# Patient Record
Sex: FEMALE | Race: BLACK OR AFRICAN AMERICAN | Employment: OTHER | URBAN - METROPOLITAN AREA
[De-identification: names, ages, dates, MRNs, and addresses within clinical notes are randomized per-mention and may not be internally consistent; named-entity substitution may affect disease eponyms.]

---

## 2019-08-13 ENCOUNTER — HOSPITAL ENCOUNTER (OUTPATIENT)
Age: 29
Discharge: HOME | End: 2019-08-13
Payer: COMMERCIAL

## 2019-08-13 ENCOUNTER — HOSPITAL ENCOUNTER (OUTPATIENT)
Age: 29
Discharge: HOME | End: 2019-08-15
Payer: COMMERCIAL

## 2019-08-13 ENCOUNTER — HOSPITAL ENCOUNTER (OUTPATIENT)
Dept: GENERAL RADIOLOGY | Age: 29
Discharge: HOME | End: 2019-08-15
Payer: COMMERCIAL

## 2019-08-13 DIAGNOSIS — R52 PAIN: ICD-10-CM

## 2019-08-13 PROCEDURE — 73030 X-RAY EXAM OF SHOULDER: CPT

## 2019-09-07 ENCOUNTER — HOSPITAL ENCOUNTER (OUTPATIENT)
Age: 29
Discharge: HOME | End: 2019-09-07
Payer: COMMERCIAL

## 2019-09-07 LAB
ALBUMIN SERPL-MCNC: 4.7 G/DL (ref 3.5–5.2)
ALP BLD-CCNC: 46 U/L (ref 35–104)
ALT SERPL-CCNC: 8 U/L (ref 0–32)
ANION GAP SERPL CALCULATED.3IONS-SCNC: 14 MMOL/L (ref 7–16)
AST SERPL-CCNC: 19 U/L (ref 0–31)
BASOPHILS ABSOLUTE: 0.02 E9/L (ref 0–0.2)
BASOPHILS RELATIVE PERCENT: 0.5 % (ref 0–2)
BILIRUB SERPL-MCNC: 0.3 MG/DL (ref 0–1.2)
BUN BLDV-MCNC: 14 MG/DL (ref 6–20)
CALCIUM SERPL-MCNC: 9 MG/DL (ref 8.6–10.2)
CHLORIDE BLD-SCNC: 103 MMOL/L (ref 98–107)
CHOLESTEROL, TOTAL: 142 MG/DL (ref 0–199)
CO2: 23 MMOL/L (ref 22–29)
CREAT SERPL-MCNC: 1.1 MG/DL (ref 0.5–1)
EOSINOPHILS ABSOLUTE: 0.25 E9/L (ref 0.05–0.5)
EOSINOPHILS RELATIVE PERCENT: 6.1 % (ref 0–6)
GFR AFRICAN AMERICAN: >60
GFR NON-AFRICAN AMERICAN: >60 ML/MIN/1.73
GLUCOSE BLD-MCNC: 93 MG/DL (ref 74–99)
HCT VFR BLD CALC: 34.4 % (ref 34–48)
HDLC SERPL-MCNC: 97 MG/DL
HEMOGLOBIN: 10.4 G/DL (ref 11.5–15.5)
IMMATURE GRANULOCYTES #: 0.01 E9/L
IMMATURE GRANULOCYTES %: 0.2 % (ref 0–5)
LDL CHOLESTEROL CALCULATED: 38 MG/DL (ref 0–99)
LYMPHOCYTES ABSOLUTE: 1.58 E9/L (ref 1.5–4)
LYMPHOCYTES RELATIVE PERCENT: 38.3 % (ref 20–42)
MCH RBC QN AUTO: 25.4 PG (ref 26–35)
MCHC RBC AUTO-ENTMCNC: 30.2 % (ref 32–34.5)
MCV RBC AUTO: 84.1 FL (ref 80–99.9)
MONOCYTES ABSOLUTE: 0.39 E9/L (ref 0.1–0.95)
MONOCYTES RELATIVE PERCENT: 9.5 % (ref 2–12)
NEUTROPHILS ABSOLUTE: 1.87 E9/L (ref 1.8–7.3)
NEUTROPHILS RELATIVE PERCENT: 45.4 % (ref 43–80)
PDW BLD-RTO: 20 FL (ref 11.5–15)
PLATELET # BLD: 275 E9/L (ref 130–450)
PMV BLD AUTO: 9.4 FL (ref 7–12)
POTASSIUM SERPL-SCNC: 4.1 MMOL/L (ref 3.5–5)
RBC # BLD: 4.09 E12/L (ref 3.5–5.5)
SODIUM BLD-SCNC: 140 MMOL/L (ref 132–146)
TOTAL PROTEIN: 7.8 G/DL (ref 6.4–8.3)
TRIGL SERPL-MCNC: 35 MG/DL (ref 0–149)
TSH SERPL DL<=0.05 MIU/L-ACNC: 0.88 UIU/ML (ref 0.27–4.2)
VITAMIN D 25-HYDROXY: 20 NG/ML (ref 30–100)
VLDLC SERPL CALC-MCNC: 7 MG/DL
WBC # BLD: 4.1 E9/L (ref 4.5–11.5)

## 2019-09-07 PROCEDURE — 84443 ASSAY THYROID STIM HORMONE: CPT

## 2019-09-07 PROCEDURE — 80053 COMPREHEN METABOLIC PANEL: CPT

## 2019-09-07 PROCEDURE — 80061 LIPID PANEL: CPT

## 2019-09-07 PROCEDURE — 82306 VITAMIN D 25 HYDROXY: CPT

## 2019-09-07 PROCEDURE — 36415 COLL VENOUS BLD VENIPUNCTURE: CPT

## 2019-09-07 PROCEDURE — 85025 COMPLETE CBC W/AUTO DIFF WBC: CPT

## 2021-03-01 ENCOUNTER — HOSPITAL ENCOUNTER (EMERGENCY)
Age: 31
Discharge: HOME | End: 2021-03-01
Attending: EMERGENCY MEDICINE
Payer: COMMERCIAL

## 2021-03-01 ENCOUNTER — HOSPITAL ENCOUNTER (EMERGENCY)
Age: 31
Discharge: HOME | End: 2021-03-01
Attending: FAMILY MEDICINE
Payer: COMMERCIAL

## 2021-03-01 ENCOUNTER — APPOINTMENT (OUTPATIENT)
Dept: ULTRASOUND IMAGING | Age: 31
End: 2021-03-01
Payer: COMMERCIAL

## 2021-03-01 VITALS
WEIGHT: 173 LBS | BODY MASS INDEX: 24.77 KG/M2 | DIASTOLIC BLOOD PRESSURE: 80 MMHG | TEMPERATURE: 98.5 F | OXYGEN SATURATION: 93 % | SYSTOLIC BLOOD PRESSURE: 130 MMHG | HEIGHT: 70 IN | HEART RATE: 104 BPM | RESPIRATION RATE: 16 BRPM

## 2021-03-01 VITALS
DIASTOLIC BLOOD PRESSURE: 63 MMHG | RESPIRATION RATE: 16 BRPM | WEIGHT: 178 LBS | TEMPERATURE: 98.2 F | BODY MASS INDEX: 25.48 KG/M2 | HEART RATE: 94 BPM | OXYGEN SATURATION: 100 % | SYSTOLIC BLOOD PRESSURE: 106 MMHG | HEIGHT: 70 IN

## 2021-03-01 DIAGNOSIS — O46.90 VAGINAL BLEEDING IN PREGNANCY: Primary | ICD-10-CM

## 2021-03-01 DIAGNOSIS — O03.9 SPONTANEOUS MISCARRIAGE: Primary | ICD-10-CM

## 2021-03-01 LAB
ABO/RH: NORMAL
BACTERIA: ABNORMAL /HPF
BASOPHILS ABSOLUTE: 0.03 E9/L (ref 0–0.2)
BASOPHILS RELATIVE PERCENT: 0.4 % (ref 0–2)
BILIRUBIN URINE: NEGATIVE
BLOOD, URINE: ABNORMAL
CLARITY: ABNORMAL
COLOR: ABNORMAL
EOSINOPHILS ABSOLUTE: 0.23 E9/L (ref 0.05–0.5)
EOSINOPHILS RELATIVE PERCENT: 2.9 % (ref 0–6)
GLUCOSE URINE: NEGATIVE MG/DL
GONADOTROPIN, CHORIONIC (HCG) QUANT: ABNORMAL MIU/ML
HCT VFR BLD CALC: 28.4 % (ref 34–48)
HEMOGLOBIN: 9.1 G/DL (ref 11.5–15.5)
IMMATURE GRANULOCYTES #: 0.02 E9/L
IMMATURE GRANULOCYTES %: 0.2 % (ref 0–5)
KETONES, URINE: NEGATIVE MG/DL
LEUKOCYTE ESTERASE, URINE: ABNORMAL
LYMPHOCYTES ABSOLUTE: 1.01 E9/L (ref 1.5–4)
LYMPHOCYTES RELATIVE PERCENT: 12.6 % (ref 20–42)
MCH RBC QN AUTO: 29.1 PG (ref 26–35)
MCHC RBC AUTO-ENTMCNC: 32 % (ref 32–34.5)
MCV RBC AUTO: 90.7 FL (ref 80–99.9)
MONOCYTES ABSOLUTE: 0.74 E9/L (ref 0.1–0.95)
MONOCYTES RELATIVE PERCENT: 9.2 % (ref 2–12)
NEUTROPHILS ABSOLUTE: 5.98 E9/L (ref 1.8–7.3)
NEUTROPHILS RELATIVE PERCENT: 74.7 % (ref 43–80)
NITRITE, URINE: NEGATIVE
PDW BLD-RTO: 19.6 FL (ref 11.5–15)
PH UA: 7 (ref 5–9)
PLATELET # BLD: 272 E9/L (ref 130–450)
PMV BLD AUTO: 10 FL (ref 7–12)
PROTEIN UA: 100 MG/DL
RBC # BLD: 3.13 E12/L (ref 3.5–5.5)
RBC UA: >20 /HPF (ref 0–2)
SPECIFIC GRAVITY UA: 1.01 (ref 1–1.03)
UROBILINOGEN, URINE: 0.2 E.U./DL
WBC # BLD: 8 E9/L (ref 4.5–11.5)
WBC UA: ABNORMAL /HPF (ref 0–5)

## 2021-03-01 PROCEDURE — 85613 RUSSELL VIPER VENOM DILUTED: CPT

## 2021-03-01 PROCEDURE — 99283 EMERGENCY DEPT VISIT LOW MDM: CPT

## 2021-03-01 PROCEDURE — 76801 OB US < 14 WKS SINGLE FETUS: CPT

## 2021-03-01 PROCEDURE — 86900 BLOOD TYPING SEROLOGIC ABO: CPT

## 2021-03-01 PROCEDURE — 86147 CARDIOLIPIN ANTIBODY EA IG: CPT

## 2021-03-01 PROCEDURE — 81291 MTHFR GENE: CPT

## 2021-03-01 PROCEDURE — 87088 URINE BACTERIA CULTURE: CPT

## 2021-03-01 PROCEDURE — 85025 COMPLETE CBC W/AUTO DIFF WBC: CPT

## 2021-03-01 PROCEDURE — 36415 COLL VENOUS BLD VENIPUNCTURE: CPT

## 2021-03-01 PROCEDURE — 84702 CHORIONIC GONADOTROPIN TEST: CPT

## 2021-03-01 PROCEDURE — 86901 BLOOD TYPING SEROLOGIC RH(D): CPT

## 2021-03-01 PROCEDURE — 88300 SURGICAL PATH GROSS: CPT

## 2021-03-01 PROCEDURE — 99282 EMERGENCY DEPT VISIT SF MDM: CPT

## 2021-03-01 PROCEDURE — 81241 F5 GENE: CPT

## 2021-03-01 PROCEDURE — 81240 F2 GENE: CPT

## 2021-03-01 PROCEDURE — 81400 MOPATH PROCEDURE LEVEL 1: CPT

## 2021-03-01 PROCEDURE — 81001 URINALYSIS AUTO W/SCOPE: CPT

## 2021-03-01 ASSESSMENT — ENCOUNTER SYMPTOMS
WHEEZING: 0
VOMITING: 0
SORE THROAT: 0
ABDOMINAL PAIN: 1
SHORTNESS OF BREATH: 0
EYE PAIN: 0
DIARRHEA: 0
COUGH: 0
NAUSEA: 0
CHOKING: 0
CHEST TIGHTNESS: 0
SINUS PAIN: 0

## 2021-03-01 NOTE — ED NOTES
Specimen sent to pathology with requision. Pt. Stated that a private service will be requested, information on how to proceed provided.      Lucila Mackay RN  03/01/21 1122

## 2021-03-01 NOTE — ED NOTES
Pt not in room-had told other staff that they are not waiting and they are going to [facility] after speaking with pt's physician.      [place] Des[place] RN  03/01/21 3672

## 2021-03-01 NOTE — ED NOTES
Pt. Requesting to see specimen, Dr. Coty Lilly made aware and ok with request, specimen taken to room.      Vic Armstrong, ZAINAB  03/01/21 2961

## 2021-03-01 NOTE — ED PROVIDER NOTES
HPI:  3/1/21,   Time: 6:22 AM TROY Vazquez is a 31 y.o. female who is approximately 14 weeks pregnant, presenting to the ED for vaginal bleeding that started around 430 this morning (less than 2 hours ago). She started having some cramping and felt sick in her stomach, nauseous, but did not have any emesis. She has had at least one loose stool since the symptoms began. She has been having intermittent spotting and what she calls menstrual bleeding in quantity, several times throughout her pregnancy. She already has had consultations with her obstetrician, Dr. Marcellina Klinefelter, and has a an ultrasound that showed a viable pregnancy. She does not recall what date this was done. ROS:   Pertinent positives and negatives are stated within HPI, all other systems reviewed and are negative.  --------------------------------------------- PAST HISTORY ---------------------------------------------  Past Medical History:  has no past medical history on file. Past Surgical History:  has a past surgical history that includes ECHO Compl W Dop Color Flow (5/2/2012); Dilation and curettage of uterus; and sinus surgery. Social History:  reports that she has never smoked. She does not have any smokeless tobacco history on file. She reports previous alcohol use. She reports that she does not use drugs. Family History: family history includes Diabetes in her maternal grandmother; Heart Disease in her maternal grandmother; High Blood Pressure in her maternal grandmother. The patients home medications have been reviewed. Allergies: Patient has no known allergies. -------------------------------------------------- RESULTS -------------------------------------------------  All laboratory and radiology results have been personally reviewed by myself   LABS:  No results found for this visit on 03/01/21.     RADIOLOGY:  Interpreted by Radiologist.  No orders to display       ------------------------- NURSING NOTES AND VITALS REVIEWED ---------------------------   The nursing notes within the ED encounter and vital signs as below have been reviewed. /80   Pulse 104   Temp 98.5 °F (36.9 °C) (Oral)   Resp 16   Ht 5' 10\" (1.778 m)   Wt 173 lb (78.5 kg)   LMP 01/03/2021   SpO2 93%   BMI 24.82 kg/m²   Oxygen Saturation Interpretation: Normal      ---------------------------------------------------PHYSICAL EXAM--------------------------------------    Constitutional/General: Alert and oriented x3, well appearing, non toxic in NAD  Head: NC/AT  Eyes: PERRL, EOMI  Mouth: Oropharynx clear, handling secretions, no trismus  Neck: Supple, full ROM, no meningeal signs  Pulmonary: Lungs clear to auscultation bilaterally, no wheezes, rales, or rhonchi. Not in respiratory distress  Cardiovascular:  Regular rate and rhythm, no murmurs, gallops, or rubs. 2+ distal pulses  Abdomen: Soft, non tender, non distended,   Extremities: Moves all extremities x 4. Warm and well perfused  Skin: warm and dry without rash  Neurologic: GCS 15,  Psych: Normal Affect      ------------------------------ ED COURSE/MEDICAL DECISION MAKING----------------------  Medications - No data to display      Medical Decision Making:    I discussed the differential diagnosis of bleeding during the first trimester pregnancy that would include threatened miscarriage, versus normal bleeding/spotting during pregnancy. Recommended blood tests for serum hCG. Offered the patient to wait here in the ED until ultrasound technician arrives with that and we will perform an and OB ultrasound. While the patient was in the ED, Dr. Asia sanchez (her obstetrician) returned the patient's call that they had placed earlier and they directed her to go to the emergency department at [facility]. A blood draw was taken to test for serum hCG, results are pending at the time the discharge. Counseling:    The emergency provider

## 2021-03-01 NOTE — ED PROVIDER NOTES
ED  Provider Note  Admit Date/RoomTime: 3/1/2021  7:35 AM  ED Room:      HPI:   Jarret Isabel is a 31 y.o. female presenting to the ED for vaginal bleeding, beginning 3 hours ago. History comes primarily from the patient. Past medical history includes none. The complaint has been persistent, moderate in severity, improved by nothing and worsened by nothing. Associated symptoms include none. [facility] awoke at approximately 6:00 this morning and noted abdominal pain and cramping as well as passage of blood clots is persistent vaginal bleeding. The patient is 14 weeks pregnant. She is G3, P0 with 1 previous  and one previous miscarriage. She follows with outpatient obstetrics, and had a recent ultrasound at her obstetric facility which revealed a live intrauterine pregnancy. Concerned by these findings, she presented to [facility] emergency department for further evaluation and treatment. On arrival, she was assessed with history, physical exam, imaging studies, laboratory studies, vital signs. The patient's vital signs were stable on arrival and she was afebrile. Review of Systems   Constitutional: Negative for appetite change, chills and fever. HENT: Negative for sinus pain and sore throat. Eyes: Negative for pain and visual disturbance. Respiratory: Negative for cough, choking, chest tightness, shortness of breath and wheezing. Cardiovascular: Negative for chest pain and palpitations. Gastrointestinal: Positive for abdominal pain. Negative for diarrhea, nausea and vomiting. Genitourinary: Positive for pelvic pain, vaginal bleeding and vaginal discharge. Negative for hematuria. Musculoskeletal: Negative for neck pain and neck stiffness. Skin: Negative for rash. Neurological: Negative for tremors, syncope and weakness. Psychiatric/Behavioral: Negative for confusion. Physical Exam  Vitals signs and nursing note reviewed. Constitutional:       Appearance: She is well-developed. HENT:      Head: Normocephalic and atraumatic. Eyes:      Pupils: Pupils are equal, round, and reactive to light. Neck:      Musculoskeletal: Normal range of motion and neck supple. Cardiovascular:      Rate and Rhythm: Normal rate and regular rhythm. Pulmonary:      Effort: Pulmonary effort is normal. No respiratory distress. Breath sounds: Normal breath sounds. No wheezing or rales. Abdominal:      General: Bowel sounds are normal.      Palpations: Abdomen is soft. Tenderness: There is no abdominal tenderness. There is no guarding or rebound. Genitourinary:     Comments: Bedside ultrasound performed. No viable intrauterine pregnancy is appreciated  Skin:     General: Skin is warm and dry. Neurological:      Mental Status: She is alert and oriented to person, place, and time. Cranial Nerves: No cranial nerve deficit. Coordination: Coordination normal.          Procedures     MDM  Number of Diagnoses or Management Options  Spontaneous miscarriage  Diagnosis management comments: Emergency Department evaluation was notable for miscarriage. Patient actively miscarried while in the emergency department, with passage of products of conception. Her vital signs remained stable throughout the entirety of her emergency department stay. Her bleeding is significantly diminished after passage of these products. Patient is understandably upset at this event, however it is clinically stable and is considered appropriate for discharge to home with outpatient follow-up with her obstetrician. They were advised to return to the emergency department should they develop fever, chills, night sweats, nausea, vomiting, diarrhea, chest pain, shortness of breath, or worsening of their symptoms despite treatment from this emergency department visit. They were instructed to follow-up with their primary care provider in 2 days.  This information was relayed to the patient who understood this plan of care and was amenable to the plan. ED Course as of Mar 01 1025   Mon Mar 01, 2021   0068 Patient went to the restroom and passed clearly visible products of conception. [RK]   5942 Discussed patient with Dr. Nicole Hutchins. Given that this is the patient's third loss, he advised that patient have outpatient labs drawn including thrombophilia panel, cardiolipin, factor V Leiden. Advised to follow-up with the patient in 10 days at his facility. This information was relayed to the patient and her significant other. She states that her bleeding and cramping have diminished some passage of products of conception. They wish to see the remains before being discharged. [RK]      ED Course User Index  [RK] Sandy Út 45., DO          ED Course as of Mar 01 1025   Mon Mar 01, 2021   3016 Patient went to the restroom and passed clearly visible products of conception. [RK]   0617 Discussed patient with Dr. Nicole Hutchins. Given that this is the patient's third loss, he advised that patient have outpatient labs drawn including thrombophilia panel, cardiolipin, factor V Leiden. Advised to follow-up with the patient in 10 days at his facility. This information was relayed to the patient and her significant other. She states that her bleeding and cramping have diminished some passage of products of conception. They wish to see the remains before being discharged. [RK]      ED Course User Index  [RK] Ishmael Witt, DO       --------------------------------------------- PAST HISTORY ---------------------------------------------  Past Medical History:  has no past medical history on file. Past Surgical History:  has a past surgical history that includes ECHO Compl W Dop Color Flow (5/2/2012); Dilation and curettage of uterus; and sinus surgery. Social History:  reports that she has never smoked.  She does not have any smokeless tobacco history on file. She reports previous alcohol use. She reports that she does not use drugs. Family History: family history includes Diabetes in her maternal grandmother; Heart Disease in her maternal grandmother; High Blood Pressure in her maternal grandmother. The patients home medications have been reviewed. Allergies: Patient has no known allergies.     -------------------------------------------------- RESULTS -------------------------------------------------  Labs:  Results for orders placed or performed during the hospital encounter of 03/01/21   CBC Auto Differential   Result Value Ref Range    WBC 8.0 4.5 - 11.5 E9/L    RBC 3.13 (L) 3.50 - 5.50 E12/L    Hemoglobin 9.1 (L) 11.5 - 15.5 g/dL    Hematocrit 28.4 (L) 34.0 - 48.0 %    MCV 90.7 80.0 - 99.9 fL    MCH 29.1 26.0 - 35.0 pg    MCHC 32.0 32.0 - 34.5 %    RDW 19.6 (H) 11.5 - 15.0 fL    Platelets 038 141 - 845 E9/L    MPV 10.0 7.0 - 12.0 fL    Neutrophils % 74.7 43.0 - 80.0 %    Immature Granulocytes % 0.2 0.0 - 5.0 %    Lymphocytes % 12.6 (L) 20.0 - 42.0 %    Monocytes % 9.2 2.0 - 12.0 %    Eosinophils % 2.9 0.0 - 6.0 %    Basophils % 0.4 0.0 - 2.0 %    Neutrophils Absolute 5.98 1.80 - 7.30 E9/L    Immature Granulocytes # 0.02 E9/L    Lymphocytes Absolute 1.01 (L) 1.50 - 4.00 E9/L    Monocytes Absolute 0.74 0.10 - 0.95 E9/L    Eosinophils Absolute 0.23 0.05 - 0.50 E9/L    Basophils Absolute 0.03 0.00 - 0.20 E9/L   Urinalysis, reflex to microscopic   Result Value Ref Range    Color, UA PINK Straw/Yellow    Clarity, UA SL CLOUDY Clear    Glucose, Ur Negative Negative mg/dL    Bilirubin Urine Negative Negative    Ketones, Urine Negative Negative mg/dL    Specific Gravity, UA 1.010 1.005 - 1.030    Blood, Urine LARGE (A) Negative    pH, UA 7.0 5.0 - 9.0    Protein,  (A) Negative mg/dL    Urobilinogen, Urine 0.2 <2.0 E.U./dL    Nitrite, Urine Negative Negative    Leukocyte Esterase, Urine MODERATE (A) Negative   Microscopic Urinalysis   Result Value Ref Range    WBC, UA 5-10 (A) 0 - 5 /HPF    RBC, UA >20 0 - 2 /HPF    Bacteria, UA NONE SEEN None Seen /HPF   ABO/RH   Result Value Ref Range    ABO/Rh O POS        Radiology:  US OB LESS THAN 14 WEEKS SINGLE OR FIRST GESTATION   Final Result   1. No evidence of a live intrauterine pregnancy. 2. Marked endometrial thickening with heterogeneous appearance. The findings   could be related to incomplete .          ------------------------- NURSING NOTES AND VITALS REVIEWED ---------------------------  Date / Time Roomed:  3/1/2021  7:35 AM  ED Bed Assignment:      The nursing notes within the ED encounter and vital signs as below have been reviewed. /63   Pulse 94   Temp 98.2 °F (36.8 °C) (Oral)   Resp 16   Ht 5' 10\" (1.778 m)   Wt 178 lb (80.7 kg)   LMP 2021   SpO2 100%   BMI 25.54 kg/m²   Oxygen Saturation Interpretation: Normal      ------------------------------------------ PROGRESS NOTES ------------------------------------------  9:55 AM EST  I have spoken with the patient and discussed todays results, in addition to providing specific details for the plan of care and counseling regarding the diagnosis and prognosis. Their questions are answered at this time and they are agreeable with the plan. I discussed at length with them reasons for immediate return here for re evaluation. They will followup with their OBGYN by calling their office tomorrow. --------------------------------- ADDITIONAL PROVIDER NOTES ---------------------------------  At this time the patient is without objective evidence of an acute process requiring hospitalization or inpatient management. They have remained hemodynamically stable throughout their entire ED visit and are stable for discharge with outpatient follow-up. The plan has been discussed in detail and they are aware of the specific conditions for emergent return, as well as the importance of follow-up.       New Prescriptions    No

## 2021-03-01 NOTE — ED NOTES
While in bathroom pt. Passed what appears to be product of conception, specimen placed in specimen bowl and Dr. Laurie Ledbetter notified.      Zbigniew Ruvalcaba RN  03/01/21 1024

## 2021-03-02 LAB — LUPUS ANTICOAG DVVT: NORMAL

## 2021-03-03 ENCOUNTER — HOSPITAL ENCOUNTER (EMERGENCY)
Age: 31
Discharge: HOME | End: 2021-03-03
Attending: EMERGENCY MEDICINE
Payer: COMMERCIAL

## 2021-03-03 ENCOUNTER — ANESTHESIA (OUTPATIENT)
Dept: OPERATING ROOM | Age: 31
End: 2021-03-03
Payer: COMMERCIAL

## 2021-03-03 ENCOUNTER — ANESTHESIA EVENT (OUTPATIENT)
Dept: OPERATING ROOM | Age: 31
End: 2021-03-03
Payer: COMMERCIAL

## 2021-03-03 VITALS — DIASTOLIC BLOOD PRESSURE: 64 MMHG | SYSTOLIC BLOOD PRESSURE: 118 MMHG | OXYGEN SATURATION: 96 %

## 2021-03-03 VITALS
BODY MASS INDEX: 26.2 KG/M2 | HEART RATE: 74 BPM | OXYGEN SATURATION: 100 % | SYSTOLIC BLOOD PRESSURE: 118 MMHG | HEIGHT: 70 IN | RESPIRATION RATE: 20 BRPM | DIASTOLIC BLOOD PRESSURE: 62 MMHG | TEMPERATURE: 98.4 F | WEIGHT: 183 LBS

## 2021-03-03 DIAGNOSIS — O03.4 INCOMPLETE MISCARRIAGE: Primary | ICD-10-CM

## 2021-03-03 LAB
ABO/RH: NORMAL
ANION GAP SERPL CALCULATED.3IONS-SCNC: 10 MMOL/L (ref 7–16)
ANTIBODY SCREEN: NORMAL
APTT: 26.3 SEC (ref 24.5–35.1)
BASOPHILS ABSOLUTE: 0.02 E9/L (ref 0–0.2)
BASOPHILS RELATIVE PERCENT: 0.2 % (ref 0–2)
BUN BLDV-MCNC: 7 MG/DL (ref 6–20)
CALCIUM SERPL-MCNC: 9.4 MG/DL (ref 8.6–10.2)
CHLORIDE BLD-SCNC: 101 MMOL/L (ref 98–107)
CO2: 24 MMOL/L (ref 22–29)
CREAT SERPL-MCNC: 0.7 MG/DL (ref 0.5–1)
EOSINOPHILS ABSOLUTE: 0.13 E9/L (ref 0.05–0.5)
EOSINOPHILS RELATIVE PERCENT: 1.4 % (ref 0–6)
GFR AFRICAN AMERICAN: >60
GFR NON-AFRICAN AMERICAN: >60 ML/MIN/1.73
GLUCOSE BLD-MCNC: 76 MG/DL (ref 74–99)
HCT VFR BLD CALC: 28.7 % (ref 34–48)
HEMOGLOBIN: 9.1 G/DL (ref 11.5–15.5)
IMMATURE GRANULOCYTES #: 0.03 E9/L
IMMATURE GRANULOCYTES %: 0.3 % (ref 0–5)
INR BLD: 1
LYMPHOCYTES ABSOLUTE: 1.41 E9/L (ref 1.5–4)
LYMPHOCYTES RELATIVE PERCENT: 15.6 % (ref 20–42)
MCH RBC QN AUTO: 28.5 PG (ref 26–35)
MCHC RBC AUTO-ENTMCNC: 31.7 % (ref 32–34.5)
MCV RBC AUTO: 90 FL (ref 80–99.9)
MONOCYTES ABSOLUTE: 0.7 E9/L (ref 0.1–0.95)
MONOCYTES RELATIVE PERCENT: 7.8 % (ref 2–12)
NEUTROPHILS ABSOLUTE: 6.72 E9/L (ref 1.8–7.3)
NEUTROPHILS RELATIVE PERCENT: 74.7 % (ref 43–80)
ORGANISM: ABNORMAL
PDW BLD-RTO: 19.1 FL (ref 11.5–15)
PLATELET # BLD: 299 E9/L (ref 130–450)
PMV BLD AUTO: 9.8 FL (ref 7–12)
POTASSIUM REFLEX MAGNESIUM: 3.7 MMOL/L (ref 3.5–5)
PROTHROMBIN TIME: 11 SEC (ref 9.3–12.4)
RBC # BLD: 3.19 E12/L (ref 3.5–5.5)
SODIUM BLD-SCNC: 135 MMOL/L (ref 132–146)
URINE CULTURE, ROUTINE: ABNORMAL
WBC # BLD: 9 E9/L (ref 4.5–11.5)

## 2021-03-03 PROCEDURE — 3700000001 HC ADD 15 MINUTES (ANESTHESIA): Performed by: OBSTETRICS & GYNECOLOGY

## 2021-03-03 PROCEDURE — 87040 BLOOD CULTURE FOR BACTERIA: CPT

## 2021-03-03 PROCEDURE — 2709999900 HC NON-CHARGEABLE SUPPLY: Performed by: OBSTETRICS & GYNECOLOGY

## 2021-03-03 PROCEDURE — 99282 EMERGENCY DEPT VISIT SF MDM: CPT

## 2021-03-03 PROCEDURE — 85610 PROTHROMBIN TIME: CPT

## 2021-03-03 PROCEDURE — 2580000003 HC RX 258: Performed by: NURSE ANESTHETIST, CERTIFIED REGISTERED

## 2021-03-03 PROCEDURE — 7100000000 HC PACU RECOVERY - FIRST 15 MIN: Performed by: OBSTETRICS & GYNECOLOGY

## 2021-03-03 PROCEDURE — 2500000003 HC RX 250 WO HCPCS: Performed by: GENERAL PRACTICE

## 2021-03-03 PROCEDURE — 96374 THER/PROPH/DIAG INJ IV PUSH: CPT

## 2021-03-03 PROCEDURE — 2500000003 HC RX 250 WO HCPCS: Performed by: NURSE ANESTHETIST, CERTIFIED REGISTERED

## 2021-03-03 PROCEDURE — 3600000002 HC SURGERY LEVEL 2 BASE: Performed by: OBSTETRICS & GYNECOLOGY

## 2021-03-03 PROCEDURE — 80048 BASIC METABOLIC PNL TOTAL CA: CPT

## 2021-03-03 PROCEDURE — 88305 TISSUE EXAM BY PATHOLOGIST: CPT

## 2021-03-03 PROCEDURE — 96375 TX/PRO/DX INJ NEW DRUG ADDON: CPT

## 2021-03-03 PROCEDURE — 86850 RBC ANTIBODY SCREEN: CPT

## 2021-03-03 PROCEDURE — 6360000002 HC RX W HCPCS: Performed by: NURSE ANESTHETIST, CERTIFIED REGISTERED

## 2021-03-03 PROCEDURE — 86900 BLOOD TYPING SEROLOGIC ABO: CPT

## 2021-03-03 PROCEDURE — 86901 BLOOD TYPING SEROLOGIC RH(D): CPT

## 2021-03-03 PROCEDURE — 3600000012 HC SURGERY LEVEL 2 ADDTL 15MIN: Performed by: OBSTETRICS & GYNECOLOGY

## 2021-03-03 PROCEDURE — 6360000002 HC RX W HCPCS: Performed by: GENERAL PRACTICE

## 2021-03-03 PROCEDURE — 85025 COMPLETE CBC W/AUTO DIFF WBC: CPT

## 2021-03-03 PROCEDURE — 7100000001 HC PACU RECOVERY - ADDTL 15 MIN: Performed by: OBSTETRICS & GYNECOLOGY

## 2021-03-03 PROCEDURE — 3700000000 HC ANESTHESIA ATTENDED CARE: Performed by: OBSTETRICS & GYNECOLOGY

## 2021-03-03 PROCEDURE — 85730 THROMBOPLASTIN TIME PARTIAL: CPT

## 2021-03-03 PROCEDURE — 2580000003 HC RX 258: Performed by: GENERAL PRACTICE

## 2021-03-03 RX ORDER — MEPERIDINE HYDROCHLORIDE 25 MG/ML
12.5 INJECTION INTRAMUSCULAR; INTRAVENOUS; SUBCUTANEOUS EVERY 5 MIN PRN
Status: DISCONTINUED | OUTPATIENT
Start: 2021-03-03 | End: 2021-03-03 | Stop reason: HOSPADM

## 2021-03-03 RX ORDER — ONDANSETRON 2 MG/ML
INJECTION INTRAMUSCULAR; INTRAVENOUS PRN
Status: DISCONTINUED | OUTPATIENT
Start: 2021-03-03 | End: 2021-03-03 | Stop reason: SDUPTHER

## 2021-03-03 RX ORDER — IBUPROFEN 600 MG/1
600 TABLET ORAL EVERY 6 HOURS PRN
Qty: 30 TABLET | Refills: 0 | Status: ON HOLD | OUTPATIENT
Start: 2021-03-03 | End: 2022-02-27 | Stop reason: HOSPADM

## 2021-03-03 RX ORDER — FENTANYL CITRATE 50 UG/ML
50 INJECTION, SOLUTION INTRAMUSCULAR; INTRAVENOUS EVERY 5 MIN PRN
Status: DISCONTINUED | OUTPATIENT
Start: 2021-03-03 | End: 2021-03-03 | Stop reason: HOSPADM

## 2021-03-03 RX ORDER — PROPOFOL 10 MG/ML
INJECTION, EMULSION INTRAVENOUS PRN
Status: DISCONTINUED | OUTPATIENT
Start: 2021-03-03 | End: 2021-03-03 | Stop reason: SDUPTHER

## 2021-03-03 RX ORDER — MORPHINE SULFATE 2 MG/ML
1 INJECTION, SOLUTION INTRAMUSCULAR; INTRAVENOUS EVERY 5 MIN PRN
Status: DISCONTINUED | OUTPATIENT
Start: 2021-03-03 | End: 2021-03-03 | Stop reason: HOSPADM

## 2021-03-03 RX ORDER — LIDOCAINE HYDROCHLORIDE 20 MG/ML
INJECTION, SOLUTION INFILTRATION; PERINEURAL PRN
Status: DISCONTINUED | OUTPATIENT
Start: 2021-03-03 | End: 2021-03-03 | Stop reason: SDUPTHER

## 2021-03-03 RX ORDER — MORPHINE SULFATE 2 MG/ML
2 INJECTION, SOLUTION INTRAMUSCULAR; INTRAVENOUS EVERY 5 MIN PRN
Status: DISCONTINUED | OUTPATIENT
Start: 2021-03-03 | End: 2021-03-03 | Stop reason: HOSPADM

## 2021-03-03 RX ORDER — DEXAMETHASONE SODIUM PHOSPHATE 4 MG/ML
INJECTION, SOLUTION INTRA-ARTICULAR; INTRALESIONAL; INTRAMUSCULAR; INTRAVENOUS; SOFT TISSUE PRN
Status: DISCONTINUED | OUTPATIENT
Start: 2021-03-03 | End: 2021-03-03 | Stop reason: SDUPTHER

## 2021-03-03 RX ORDER — MIDAZOLAM HYDROCHLORIDE 1 MG/ML
INJECTION INTRAMUSCULAR; INTRAVENOUS PRN
Status: DISCONTINUED | OUTPATIENT
Start: 2021-03-03 | End: 2021-03-03 | Stop reason: SDUPTHER

## 2021-03-03 RX ORDER — FENTANYL CITRATE 50 UG/ML
25 INJECTION, SOLUTION INTRAMUSCULAR; INTRAVENOUS EVERY 5 MIN PRN
Status: DISCONTINUED | OUTPATIENT
Start: 2021-03-03 | End: 2021-03-03 | Stop reason: HOSPADM

## 2021-03-03 RX ORDER — ONDANSETRON 2 MG/ML
4 INJECTION INTRAMUSCULAR; INTRAVENOUS
Status: DISCONTINUED | OUTPATIENT
Start: 2021-03-03 | End: 2021-03-03 | Stop reason: HOSPADM

## 2021-03-03 RX ORDER — PROMETHAZINE HYDROCHLORIDE 25 MG/ML
6.25 INJECTION, SOLUTION INTRAMUSCULAR; INTRAVENOUS
Status: DISCONTINUED | OUTPATIENT
Start: 2021-03-03 | End: 2021-03-03 | Stop reason: HOSPADM

## 2021-03-03 RX ORDER — SODIUM CHLORIDE 9 MG/ML
INJECTION, SOLUTION INTRAVENOUS CONTINUOUS PRN
Status: DISCONTINUED | OUTPATIENT
Start: 2021-03-03 | End: 2021-03-03 | Stop reason: SDUPTHER

## 2021-03-03 RX ORDER — CLINDAMYCIN PHOSPHATE 900 MG/50ML
900 INJECTION INTRAVENOUS ONCE
Status: COMPLETED | OUTPATIENT
Start: 2021-03-03 | End: 2021-03-03

## 2021-03-03 RX ORDER — OXYCODONE HYDROCHLORIDE AND ACETAMINOPHEN 5; 325 MG/1; MG/1
1 TABLET ORAL
Status: DISCONTINUED | OUTPATIENT
Start: 2021-03-03 | End: 2021-03-03 | Stop reason: HOSPADM

## 2021-03-03 RX ORDER — FENTANYL CITRATE 50 UG/ML
INJECTION, SOLUTION INTRAMUSCULAR; INTRAVENOUS PRN
Status: DISCONTINUED | OUTPATIENT
Start: 2021-03-03 | End: 2021-03-03 | Stop reason: SDUPTHER

## 2021-03-03 RX ADMIN — FENTANYL CITRATE 100 MCG: 50 INJECTION, SOLUTION INTRAMUSCULAR; INTRAVENOUS at 17:15

## 2021-03-03 RX ADMIN — CLINDAMYCIN IN 5 PERCENT DEXTROSE 900 MG: 18 INJECTION, SOLUTION INTRAVENOUS at 15:20

## 2021-03-03 RX ADMIN — CEFTRIAXONE 1000 MG: 1 INJECTION, POWDER, FOR SOLUTION INTRAMUSCULAR; INTRAVENOUS at 15:20

## 2021-03-03 RX ADMIN — PROPOFOL 200 MG: 10 INJECTION, EMULSION INTRAVENOUS at 17:15

## 2021-03-03 RX ADMIN — DEXAMETHASONE SODIUM PHOSPHATE 10 MG: 4 INJECTION, SOLUTION INTRAMUSCULAR; INTRAVENOUS at 17:20

## 2021-03-03 RX ADMIN — ONDANSETRON 4 MG: 2 INJECTION INTRAMUSCULAR; INTRAVENOUS at 17:20

## 2021-03-03 RX ADMIN — MIDAZOLAM 2 MG: 1 INJECTION INTRAMUSCULAR; INTRAVENOUS at 17:05

## 2021-03-03 RX ADMIN — LIDOCAINE HYDROCHLORIDE 100 MG: 20 INJECTION, SOLUTION INFILTRATION; PERINEURAL at 17:15

## 2021-03-03 RX ADMIN — SODIUM CHLORIDE: 9 INJECTION, SOLUTION INTRAVENOUS at 15:35

## 2021-03-03 ASSESSMENT — PULMONARY FUNCTION TESTS
PIF_VALUE: 4
PIF_VALUE: 0
PIF_VALUE: 4
PIF_VALUE: 1
PIF_VALUE: 16
PIF_VALUE: 16
PIF_VALUE: 1
PIF_VALUE: 1
PIF_VALUE: 16
PIF_VALUE: 1
PIF_VALUE: 12
PIF_VALUE: 16
PIF_VALUE: 0
PIF_VALUE: 16
PIF_VALUE: 16

## 2021-03-03 ASSESSMENT — ENCOUNTER SYMPTOMS
SHORTNESS OF BREATH: 0
DIARRHEA: 0
SORE THROAT: 0
SINUS PAIN: 0
CHOKING: 0
NAUSEA: 0
WHEEZING: 0
EYE PAIN: 0
ABDOMINAL PAIN: 0
COUGH: 0
CHEST TIGHTNESS: 0
VOMITING: 0

## 2021-03-03 ASSESSMENT — PAIN SCALES - GENERAL: PAINLEVEL_OUTOF10: 2

## 2021-03-03 ASSESSMENT — LIFESTYLE VARIABLES: SMOKING_STATUS: 0

## 2021-03-03 NOTE — H&P
Department of Obstetrics & Gynecology  GYNECOLOGIC ADMISSION  HISTORY & PHYSICAL      CHIEF COMPLAINT:  Incomplete   Chief Complaint   Patient presents with    Other     sent by [facility] for possible D&C, states miscarried Monday       HISTORY OF PRESENT ILLNESS:    The patient is a 31 y.o. female, , who was 14w6d and developed lower pelvic cramping and bleeding. After arrival to [facility], she passed the fetus, and was thereafter reported as having passed all products of conception. She was seen in FU in office earlier today, with some ongoing spotting of onset earlier today, and Office sono showed a thickened EMC, suggestive of retained tissue of Placental origin. She was then sent to the ER for IV Antibiotics, and scheduled for a D&C. She now presents for same. Chief Complaint   Patient presents with    Other     sent by [facility] for possible D&C, states miscarried Monday   . PAST OB HISTORY  OB History        1    Para        Term                AB        Living           SAB        TAB        Ectopic        Molar        Multiple        Live Births                    PAST MEDICAL HISTORY:    No past medical history on file. PAST SURGICAL HISTORY:        Procedure Laterality Date    DILATION AND CURETTAGE OF UTERUS      ECHO COMPL W DOP COLOR FLOW  2012         SINUS SURGERY      for epistaxis     ALLERGIES:  Patient has no known allergies.   SOCIAL HISTORY:    Social History     Socioeconomic History    Marital status:      Spouse name: Not on file    Number of children: Not on file    Years of education: Not on file    Highest education level: Not on file   Occupational History    Not on file   Social Needs    Financial resource strain: Not on file    Food insecurity     Worry: Not on file     Inability: Not on file    Transportation needs     Medical: Not on file     Non-medical: Not on file   Tobacco Use    Smoking status: Never Smoker   Substance and Sexual Activity    Alcohol use: Not Currently     Comment: ONCE OR TWICE A MONTH    Drug use: No    Sexual activity: Never   Lifestyle    Physical activity     Days per week: Not on file     Minutes per session: Not on file    Stress: Not on file   Relationships    Social connections     Talks on phone: Not on file     Gets together: Not on file     Attends [employer] service: Not on file     Active member of club or organization: Not on file     Attends meetings of clubs or organizations: Not on file     Relationship status: Not on file    Intimate partner violence     Fear of current or ex partner: Not on file     Emotionally abused: Not on file     Physically abused: Not on file     Forced sexual activity: Not on file   Other Topics Concern    Not on file   Social History Narrative    ** Merged History Encounter **          FAMILY HISTORY:       Problem Relation Age of Onset    Heart Disease Maternal Grandmother     High Blood Pressure Maternal Grandmother     Diabetes Maternal Grandmother      MEDICATIONS PRIOR TO ADMISSION:  Not in a hospital admission. REVIEW OF SYSTEMS:    CONSTITUTIONAL:  negative  RESPIRATORY:  negative  CARDIOVASCULAR:  negative  GASTROINTESTINAL:  negative  ALLERGIC/IMMUNOLOGIC:  negative  NEUROLOGICAL:  negative  BEHAVIOR/PSYCH:  negative    ROS: Negative except for HPI. PHYSICAL EXAM: General Appearance:  awake, alert, oriented, in no acute distress  Eyes:  No gross abnormalities. , PERRL, EOMI and Sclera nonicteric  Lungs:  Normal expansion. Clear to auscultation. No rales, rhonchi, or wheezing. Heart:  Heart regular rate and rhythm  Abdomen:  Soft, non-tender, normal bowel sounds. No bruits, organomegaly or masses. Pelvic:  Uterus enlarged to about 12 wks size, mildly tender to cervical motion. Cervical os 1cm Dilated with some mucoid sanguinous discharge. ASSESSMENT: Incomplete .     PLAN: D&C    Stu Juan MD, Gerber Vela

## 2021-03-03 NOTE — ED PROVIDER NOTES
ED  Provider Note  Admit Date/RoomTime: 3/3/2021  1:42 PM  ED Room: OR POOL/NONE     HPI:   Madie Aase is a 31 y.o. female presenting to the ED for vaginal bleeding, beginning 2 days ago. History comes primarily from the patient and the Medical Record. Past medical history includes recent spontaneous miscarriage 2 days ago, initially assessed at [facility] ED. The complaint has been intermittent, moderate in severity, improved by nothing and worsened by nothing. Associated symptoms include pelvic cramping. Axel Boyd had follow-up with her gynecologist after having a spontaneous loss of pregnancy at approximately 14 weeks on March 1. During the assessment by the gynecologist, it was noted that, although the patient believed that she had passed the entirety of her products of conception, it was evident that there is still retention of these products. This places the patient at increased risk for persistent dangerous vaginal bleeding as well as possible infection. For this reason she required OR evaluation for D&C. Her gynecologist directed her to [facility] emergency department where she could have initial labs drawn and weight under a monitored setting prior to being taken to the OR. Review of Systems   Constitutional: Negative for appetite change, chills and fever. HENT: Negative for sinus pain and sore throat. Eyes: Negative for pain and visual disturbance. Respiratory: Negative for cough, choking, chest tightness, shortness of breath and wheezing. Cardiovascular: Negative for chest pain and palpitations. Gastrointestinal: Negative for abdominal pain, diarrhea, nausea and vomiting. Genitourinary: Positive for pelvic pain and vaginal bleeding. Negative for hematuria. Musculoskeletal: Negative for neck pain and neck stiffness. Skin: Negative for rash. Neurological: Negative for tremors, syncope and weakness. Psychiatric/Behavioral: Negative for confusion. Physical Exam  Vitals signs and nursing note reviewed. Constitutional:       Appearance: She is well-developed. HENT:      Head: Normocephalic and atraumatic. Eyes:      Pupils: Pupils are equal, round, and reactive to light. Neck:      Musculoskeletal: Normal range of motion and neck supple. Cardiovascular:      Rate and Rhythm: Normal rate and regular rhythm. Pulmonary:      Effort: Pulmonary effort is normal. No respiratory distress. Breath sounds: Normal breath sounds. No wheezing or rales. Abdominal:      General: Bowel sounds are normal.      Palpations: Abdomen is soft. Tenderness: There is no abdominal tenderness. There is no guarding or rebound. Skin:     General: Skin is warm and dry. Neurological:      Mental Status: She is alert and oriented to person, place, and time. Cranial Nerves: No cranial nerve deficit. Coordination: Coordination normal.          Procedures     MDM  Number of Diagnoses or Management Options  Diagnosis management comments: Emergency department evaluation was notable for a patient with a recent spontaneous miscarriage who required further D&C as per the direction of her obstetrician. She was assessed in the emergency department prior to being taken to the operating room for this procedure. This information was relayed to the patient who understood this plan of care and was amenable to the plan. Patient was discussed with the admitting service (Dr. Shaan Reid) who concurred with the decision for admission, and have agreed to admit the patient to the OR.          --------------------------------------------- PAST HISTORY ---------------------------------------------  Past Medical History:  has no past medical history on file. Past Surgical History:  has a past surgical history that includes ECHO Compl W Dop Color Flow (5/2/2012); Dilation and curettage of uterus; and sinus surgery. Social History:  reports that she has never smoked. She does not have any smokeless tobacco history on file. She reports previous alcohol use. She reports that she does not use drugs. Family History: family history includes Diabetes in her maternal grandmother; Heart Disease in her maternal grandmother; High Blood Pressure in her maternal grandmother. The patients home medications have been reviewed. Allergies: Patient has no known allergies.     -------------------------------------------------- RESULTS -------------------------------------------------    LABS:  Results for orders placed or performed during the hospital encounter of 03/03/21   CBC Auto Differential   Result Value Ref Range    WBC 9.0 4.5 - 11.5 E9/L    RBC 3.19 (L) 3.50 - 5.50 E12/L    Hemoglobin 9.1 (L) 11.5 - 15.5 g/dL    Hematocrit 28.7 (L) 34.0 - 48.0 %    MCV 90.0 80.0 - 99.9 fL    MCH 28.5 26.0 - 35.0 pg    MCHC 31.7 (L) 32.0 - 34.5 %    RDW 19.1 (H) 11.5 - 15.0 fL    Platelets 384 585 - 655 E9/L    MPV 9.8 7.0 - 12.0 fL    Neutrophils % 74.7 43.0 - 80.0 %    Immature Granulocytes % 0.3 0.0 - 5.0 %    Lymphocytes % 15.6 (L) 20.0 - 42.0 %    Monocytes % 7.8 2.0 - 12.0 %    Eosinophils % 1.4 0.0 - 6.0 %    Basophils % 0.2 0.0 - 2.0 %    Neutrophils Absolute 6.72 1.80 - 7.30 E9/L    Immature Granulocytes # 0.03 E9/L    Lymphocytes Absolute 1.41 (L) 1.50 - 4.00 E9/L    Monocytes Absolute 0.70 0.10 - 0.95 E9/L    Eosinophils Absolute 0.13 0.05 - 0.50 E9/L    Basophils Absolute 0.02 0.00 - 0.20 Q3/L   Basic Metabolic Panel w/ Reflex to MG   Result Value Ref Range    Sodium 135 132 - 146 mmol/L    Potassium reflex Magnesium 3.7 3.5 - 5.0 mmol/L    Chloride 101 98 - 107 mmol/L    CO2 24 22 - 29 mmol/L    Anion Gap 10 7 - 16 mmol/L    Glucose 76 74 - 99 mg/dL    BUN 7 6 - 20 mg/dL    CREATININE 0.7 0.5 - 1.0 mg/dL    GFR Non-African American >60 >=60 mL/min/1.73    GFR African American >60     Calcium 9.4 8.6 - 10.2 mg/dL   APTT   Result Value Ref Range    aPTT 26.3 24.5 - 35.1 sec Protime-INR   Result Value Ref Range    Protime 11.0 9.3 - 12.4 sec    INR 1.0    TYPE AND SCREEN   Result Value Ref Range    ABO/Rh O POS     Antibody Screen NEG        RADIOLOGY:  No orders to display       ------------------------- NURSING NOTES AND VITALS REVIEWED ---------------------------  Date / Time Roomed:  3/3/2021  1:42 PM  ED Bed Assignment:  OR POOL/NONE    The nursing notes within the ED encounter and vital signs as below have been reviewed. Patient Vitals for the past 24 hrs:   BP Temp Temp src Pulse Resp SpO2 Height Weight   03/03/21 1744 (!) 113/58 98.4 °F (36.9 °C) -- 65 16 100 % -- --   03/03/21 1650 (!) 109/54 97.7 °F (36.5 °C) Temporal 70 16 100 % -- --   03/03/21 1456 (!) 119/58 -- -- 68 16 100 % -- --   03/03/21 1302 (!) 101/55 97.7 °F (36.5 °C) -- 98 14 97 % 5' 10\" (1.778 m) 183 lb (83 kg)       Oxygen Saturation Interpretation: Normal    ------------------------------------------ PROGRESS NOTES ------------------------------------------  Re-evaluation(s):  Patients symptoms show no change  Repeat physical examination is not changed    Counseling:  I have spoken with the patient and discussed todays results, in addition to providing specific details for the plan of care and counseling regarding the diagnosis and prognosis. Their questions are answered at this time and they are agreeable with the plan of admission.    --------------------------------- ADDITIONAL PROVIDER NOTES ---------------------------------  Consultations:  TSpoke with Dr. Joanne Bradley. Discussed case. They will admit the patient. This patient's ED course included: a personal history and physicial examination, multiple bedside re-evaluations, cardiac monitoring and continuous pulse oximetry    This patient has remained hemodynamically stable during their ED course. Diagnosis:  1. Incomplete miscarriage        Disposition:  Patient's disposition: Admit to operating room  Patient's condition is fair.
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
## 2021-03-03 NOTE — ED NOTES
Bed: 12  Expected date:   Expected time:   Means of arrival:   [facility]:  [facility]  [facility]/21 [id number]

## 2021-03-04 LAB
ANTICARDIOLIPIN IGA ANTIBODY: 2 APL (ref 0–11)
ANTICARDIOLIPIN IGG ANTIBODY: 5 GPL (ref 0–14)
CARDIOLIPIN AB IGM: 13 MPL (ref 0–12)

## 2021-03-04 NOTE — PROGRESS NOTES
Patient and  provided discharge instructions and verbalizes understanding    At time of discharge patient having moderate bleeding, noe pad changed, VSS    No complaints of pain, patient discharged home with script for pain control medication

## 2021-03-04 NOTE — ANESTHESIA POSTPROCEDURE EVALUATION
Department of Anesthesiology  Postprocedure Note    Patient: Alissa Cabot  MRN: [id number]  [place]: 1990  Date of evaluation: 3/4/2021  Time:  6:18 AM     Procedure Summary     Date: 03/03/21 Room / Location: [facility] 02 / [facility]    Anesthesia Start: 1708 Anesthesia Stop: 1401    Procedure: SUCTION DILATATION AND CURETTAGE (N/A Vagina ) Diagnosis: (/)    Surgeons: Jorge Mccord MD Responsible Provider: Dereck Nguyễn DO    Anesthesia Type: general ASA Status: 3 - Emergent          Anesthesia Type: general    Pam Phase I: Pam Score: 10    Pam Phase II:      Last vitals: Reviewed and per EMR flowsheets.        Anesthesia Post Evaluation    Patient location during evaluation: PACU  Patient participation: complete - patient participated  Level of consciousness: awake and alert  Pain score: 1  Airway patency: patent  Nausea & Vomiting: no nausea and no vomiting  Complications: no  Cardiovascular status: hemodynamically stable  Respiratory status: acceptable  Hydration status: euvolemic

## 2021-03-08 LAB
BLOOD CULTURE, ROUTINE: NORMAL
CULTURE, BLOOD 2: NORMAL

## 2021-03-11 LAB — THROMBOPHILIA DNA ASSAY: NORMAL

## 2021-07-10 ENCOUNTER — APPOINTMENT (OUTPATIENT)
Dept: ULTRASOUND IMAGING | Age: 31
End: 2021-07-10
Payer: COMMERCIAL

## 2021-07-10 ENCOUNTER — HOSPITAL ENCOUNTER (EMERGENCY)
Age: 31
Discharge: HOME | End: 2021-07-10
Attending: EMERGENCY MEDICINE
Payer: COMMERCIAL

## 2021-07-10 VITALS
BODY MASS INDEX: 26.48 KG/M2 | SYSTOLIC BLOOD PRESSURE: 131 MMHG | OXYGEN SATURATION: 98 % | WEIGHT: 185 LBS | RESPIRATION RATE: 16 BRPM | DIASTOLIC BLOOD PRESSURE: 66 MMHG | HEART RATE: 70 BPM | TEMPERATURE: 98.2 F | HEIGHT: 70 IN

## 2021-07-10 DIAGNOSIS — N83.201 RIGHT OVARIAN CYST: ICD-10-CM

## 2021-07-10 DIAGNOSIS — O20.0 THREATENED ABORTION, ANTEPARTUM: Primary | ICD-10-CM

## 2021-07-10 LAB
ABO/RH: NORMAL
ALBUMIN SERPL-MCNC: 4.2 G/DL (ref 3.5–5.2)
ALP BLD-CCNC: 40 U/L (ref 35–104)
ALT SERPL-CCNC: 7 U/L (ref 0–32)
ANION GAP SERPL CALCULATED.3IONS-SCNC: 9 MMOL/L (ref 7–16)
ANISOCYTOSIS: ABNORMAL
AST SERPL-CCNC: 14 U/L (ref 0–31)
BACTERIA: ABNORMAL /HPF
BASOPHILS ABSOLUTE: 0.01 E9/L (ref 0–0.2)
BASOPHILS RELATIVE PERCENT: 0.2 % (ref 0–2)
BILIRUB SERPL-MCNC: 0.2 MG/DL (ref 0–1.2)
BILIRUBIN URINE: NEGATIVE
BLOOD, URINE: ABNORMAL
BUN BLDV-MCNC: 8 MG/DL (ref 6–20)
CALCIUM SERPL-MCNC: 9 MG/DL (ref 8.6–10.2)
CHLORIDE BLD-SCNC: 104 MMOL/L (ref 98–107)
CLARITY: CLEAR
CO2: 24 MMOL/L (ref 22–29)
COLOR: YELLOW
CREAT SERPL-MCNC: 0.8 MG/DL (ref 0.5–1)
EOSINOPHILS ABSOLUTE: 0.19 E9/L (ref 0.05–0.5)
EOSINOPHILS RELATIVE PERCENT: 3.9 % (ref 0–6)
EPITHELIAL CELLS, UA: ABNORMAL /HPF
GFR AFRICAN AMERICAN: >60
GFR NON-AFRICAN AMERICAN: >60 ML/MIN/1.73
GLUCOSE BLD-MCNC: 92 MG/DL (ref 74–99)
GLUCOSE URINE: NEGATIVE MG/DL
GONADOTROPIN, CHORIONIC (HCG) QUANT: ABNORMAL MIU/ML
HCG, URINE, POC: POSITIVE
HCT VFR BLD CALC: 28.4 % (ref 34–48)
HEMOGLOBIN: 8.8 G/DL (ref 11.5–15.5)
HYPOCHROMIA: ABNORMAL
IMMATURE GRANULOCYTES #: 0.01 E9/L
IMMATURE GRANULOCYTES %: 0.2 % (ref 0–5)
KETONES, URINE: NEGATIVE MG/DL
LEUKOCYTE ESTERASE, URINE: NEGATIVE
LYMPHOCYTES ABSOLUTE: 1.08 E9/L (ref 1.5–4)
LYMPHOCYTES RELATIVE PERCENT: 22.3 % (ref 20–42)
Lab: NORMAL
MCH RBC QN AUTO: 25.7 PG (ref 26–35)
MCHC RBC AUTO-ENTMCNC: 31 % (ref 32–34.5)
MCV RBC AUTO: 83 FL (ref 80–99.9)
MONOCYTES ABSOLUTE: 0.47 E9/L (ref 0.1–0.95)
MONOCYTES RELATIVE PERCENT: 9.7 % (ref 2–12)
NEGATIVE QC PASS/FAIL: NORMAL
NEUTROPHILS ABSOLUTE: 3.09 E9/L (ref 1.8–7.3)
NEUTROPHILS RELATIVE PERCENT: 63.7 % (ref 43–80)
NITRITE, URINE: NEGATIVE
OVALOCYTES: ABNORMAL
PDW BLD-RTO: 21.6 FL (ref 11.5–15)
PH UA: 7 (ref 5–9)
PLATELET # BLD: 268 E9/L (ref 130–450)
PMV BLD AUTO: 9.4 FL (ref 7–12)
POIKILOCYTES: ABNORMAL
POLYCHROMASIA: ABNORMAL
POSITIVE QC PASS/FAIL: NORMAL
POTASSIUM SERPL-SCNC: 4 MMOL/L (ref 3.5–5)
PROTEIN UA: NEGATIVE MG/DL
RBC # BLD: 3.42 E12/L (ref 3.5–5.5)
RBC UA: ABNORMAL /HPF (ref 0–2)
SCHISTOCYTES: ABNORMAL
SODIUM BLD-SCNC: 137 MMOL/L (ref 132–146)
SPECIFIC GRAVITY UA: 1.02 (ref 1–1.03)
TARGET CELLS: ABNORMAL
TEAR DROP CELLS: ABNORMAL
TOTAL PROTEIN: 7.1 G/DL (ref 6.4–8.3)
TOXIC GRANULATION: ABNORMAL
UROBILINOGEN, URINE: 0.2 E.U./DL
WBC # BLD: 4.9 E9/L (ref 4.5–11.5)
WBC UA: ABNORMAL /HPF (ref 0–5)

## 2021-07-10 PROCEDURE — 76801 OB US < 14 WKS SINGLE FETUS: CPT

## 2021-07-10 PROCEDURE — 84702 CHORIONIC GONADOTROPIN TEST: CPT

## 2021-07-10 PROCEDURE — 80053 COMPREHEN METABOLIC PANEL: CPT

## 2021-07-10 PROCEDURE — 85025 COMPLETE CBC W/AUTO DIFF WBC: CPT

## 2021-07-10 PROCEDURE — 81001 URINALYSIS AUTO W/SCOPE: CPT

## 2021-07-10 PROCEDURE — 86901 BLOOD TYPING SEROLOGIC RH(D): CPT

## 2021-07-10 PROCEDURE — 86900 BLOOD TYPING SEROLOGIC ABO: CPT

## 2021-07-10 PROCEDURE — 99284 EMERGENCY DEPT VISIT MOD MDM: CPT

## 2021-07-10 ASSESSMENT — ENCOUNTER SYMPTOMS
EYE DISCHARGE: 0
WHEEZING: 0
EYE PAIN: 0
SINUS PRESSURE: 0
DIARRHEA: 0
SORE THROAT: 0
ABDOMINAL DISTENTION: 0
NAUSEA: 0
ABDOMINAL PAIN: 1
EYE REDNESS: 0
SHORTNESS OF BREATH: 0
BACK PAIN: 0
VOMITING: 0
COUGH: 0

## 2021-07-10 NOTE — ED PROVIDER NOTES
27-year-old female who has had a recent miscarriage earlier this year presents to the emergency department with a positive pregnancy test and some vaginal spotting. She states that she is also had some left-sided abdominal pressure. She denies pain in the abdomen. States the bleeding has seemed to improve slightly since last night. She states her last menstrual period was 5/27/2021. She believes she is O+ blood type and follows with Dr. Renny Garzon. The history is provided by the patient. Vaginal Bleeding  Quality:  Dark red  Severity:  Mild  Onset quality:  Gradual  Duration:  1 day  Timing:  Intermittent  Progression:  Waxing and waning  Chronicity:  New  Possible pregnancy: yes    Relieved by:  Nothing  Worsened by:  Nothing  Ineffective treatments:  None tried  Associated symptoms: abdominal pain (LLQ pressure)    Associated symptoms: no back pain, no dysuria, no fever and no nausea         Review of Systems   Constitutional: Negative for chills and fever. HENT: Negative for ear pain, sinus pressure and sore throat. Eyes: Negative for pain, discharge and redness. Respiratory: Negative for cough, shortness of breath and wheezing. Cardiovascular: Negative for chest pain. Gastrointestinal: Positive for abdominal pain (LLQ pressure). Negative for abdominal distention, diarrhea, nausea and vomiting. Genitourinary: Positive for vaginal bleeding. Negative for dysuria and frequency. Musculoskeletal: Negative for arthralgias and back pain. Skin: Negative for rash and wound. Neurological: Negative for weakness and headaches. Hematological: Negative for adenopathy. All other systems reviewed and are negative. Physical Exam  Constitutional:       Appearance: Normal appearance. HENT:      Head: Normocephalic and atraumatic. Mouth/Throat:      Mouth: Mucous membranes are moist.   Eyes:      Extraocular Movements: Extraocular movements intact.       Pupils: Pupils are equal, round, and reactive to light. Cardiovascular:      Rate and Rhythm: Normal rate and regular rhythm. Pulses: Normal pulses. Heart sounds: Normal heart sounds. Pulmonary:      Effort: Pulmonary effort is normal.      Breath sounds: Normal breath sounds. Abdominal:      General: Abdomen is flat. Bowel sounds are normal. There is no distension. Palpations: Abdomen is soft. Tenderness: There is no abdominal tenderness. There is no right CVA tenderness. Musculoskeletal:         General: Normal range of motion. Skin:     General: Skin is warm. Capillary Refill: Capillary refill takes less than 2 seconds. Neurological:      General: No focal deficit present. Mental Status: She is alert and oriented to person, place, and time. Procedures     MDM  Number of Diagnoses or Management Options  Right ovarian cyst  Threatened , antepartum  Diagnosis management comments: Patient seen and examined. Labs and imaging were ordered. Labs and ultrasound were ordered. Ultrasound revealed intrauterine pregnancy there is also a right ovarian cyst.  Blood flow was noted to the right ovary. Patient is updated on findings she has prenatal vitamins she is O+ blood type and is felt patient can follow-up with her PCP             --------------------------------------------- PAST HISTORY ---------------------------------------------  Past Medical History:  has no past medical history on file. Past Surgical History:  has a past surgical history that includes ECHO Compl W Dop Color Flow (2012); Dilation and curettage of uterus; sinus surgery; and Dilation and curettage of uterus (N/A, 3/3/2021). Social History:  reports that she has never smoked. She has never used smokeless tobacco. She reports previous alcohol use. She reports that she does not use drugs.     Family History: family history includes Diabetes in her maternal grandmother; Heart Disease in her maternal grandmother; High Blood Pressure in her maternal grandmother. The patients home medications have been reviewed. Allergies: Patient has no known allergies.     -------------------------------------------------- RESULTS -------------------------------------------------  Labs:  Results for orders placed or performed during the hospital encounter of 07/10/21   CBC auto differential   Result Value Ref Range    WBC 4.9 4.5 - 11.5 E9/L    RBC 3.42 (L) 3.50 - 5.50 E12/L    Hemoglobin 8.8 (L) 11.5 - 15.5 g/dL    Hematocrit 28.4 (L) 34.0 - 48.0 %    MCV 83.0 80.0 - 99.9 fL    MCH 25.7 (L) 26.0 - 35.0 pg    MCHC 31.0 (L) 32.0 - 34.5 %    RDW 21.6 (H) 11.5 - 15.0 fL    Platelets 064 093 - 752 E9/L    MPV 9.4 7.0 - 12.0 fL    Neutrophils % 63.7 43.0 - 80.0 %    Immature Granulocytes % 0.2 0.0 - 5.0 %    Lymphocytes % 22.3 20.0 - 42.0 %    Monocytes % 9.7 2.0 - 12.0 %    Eosinophils % 3.9 0.0 - 6.0 %    Basophils % 0.2 0.0 - 2.0 %    Neutrophils Absolute 3.09 1.80 - 7.30 E9/L    Immature Granulocytes # 0.01 E9/L    Lymphocytes Absolute 1.08 (L) 1.50 - 4.00 E9/L    Monocytes Absolute 0.47 0.10 - 0.95 E9/L    Eosinophils Absolute 0.19 0.05 - 0.50 E9/L    Basophils Absolute 0.01 0.00 - 0.20 E9/L    Toxic Granulation 1+     Anisocytosis 2+     Polychromasia 1+     Hypochromia 1+     Poikilocytes 1+     Schistocytes 1+     Ovalocytes 1+     Target Cells 1+     Tear Drop Cells 1+    Comprehensive Metabolic Panel   Result Value Ref Range    Sodium 137 132 - 146 mmol/L    Potassium 4.0 3.5 - 5.0 mmol/L    Chloride 104 98 - 107 mmol/L    CO2 24 22 - 29 mmol/L    Anion Gap 9 7 - 16 mmol/L    Glucose 92 74 - 99 mg/dL    BUN 8 6 - 20 mg/dL    CREATININE 0.8 0.5 - 1.0 mg/dL    GFR Non-African American >60 >=60 mL/min/1.73    GFR African American >60     Calcium 9.0 8.6 - 10.2 mg/dL    Total Protein 7.1 6.4 - 8.3 g/dL    Albumin 4.2 3.5 - 5.2 g/dL    Total Bilirubin 0.2 0.0 - 1.2 mg/dL    Alkaline Phosphatase 40 35 - 104 U/L    ALT 7 0 - 32 U/L    AST 14 0 - 31 U/L   HCG, Quantitative, Pregnancy   Result Value Ref Range    hCG Quant 16910.0 (H) <10 mIU/mL   URINALYSIS   Result Value Ref Range    Color, UA Yellow Straw/Yellow    Clarity, UA Clear Clear    Glucose, Ur Negative Negative mg/dL    Bilirubin Urine Negative Negative    Ketones, Urine Negative Negative mg/dL    Specific Gravity, UA 1.020 1.005 - 1.030    Blood, Urine LARGE (A) Negative    pH, UA 7.0 5.0 - 9.0    Protein, UA Negative Negative mg/dL    Urobilinogen, Urine 0.2 <2.0 E.U./dL    Nitrite, Urine Negative Negative    Leukocyte Esterase, Urine Negative Negative   Microscopic Urinalysis   Result Value Ref Range    WBC, UA 2-5 0 - 5 /HPF    RBC, UA 0-1 0 - 2 /HPF    Epithelial Cells, UA FEW /HPF    Bacteria, UA FEW (A) None Seen /HPF   POC Pregnancy Urine Qual   Result Value Ref Range    HCG, Urine, POC Positive Negative    Lot Number [id number]     Positive QC Pass/Fail Pass     Negative QC Pass/Fail Pass    ABO/RH   Result Value Ref Range    ABO/Rh O POS        Radiology:  US OB LESS THAN 14 WEEKS SINGLE OR FIRST GESTATION   Final Result   1. Single live intrauterine pregnancy with gestational age of approximately 10   weeks and 4 days by ultrasound measurement. Clinical correlation recommended. 2. Cardiac activity detected at 139 beats per minute. 3. Simple appearing cyst associated with right ovary measures approximately 6   x 5 cm. Short-term follow-up recommended. 4. Complex cyst with surrounding Doppler blood flow associated with the left   ovary likely represents a corpus luteal cyst.             ------------------------- NURSING NOTES AND VITALS REVIEWED ---------------------------  Date / Time Roomed:  7/10/2021  9:10 AM  ED Bed Assignment:  26/26    The nursing notes within the ED encounter and vital signs as below have been reviewed.    /66   Pulse 70   Temp 98.2 °F (36.8 °C) (Oral)   Resp 16   Ht 5' 10\" (1.778 m)   Wt 185 lb (83.9 kg)   LMP 01/03/2021   SpO2 98%   BMI 26.54 kg/m²   Oxygen Saturation Interpretation: Normal      ------------------------------------------ PROGRESS NOTES ------------------------------------------  I have spoken with the patient and discussed todays results, in addition to providing specific details for the plan of care and counseling regarding the diagnosis and prognosis. Their questions are answered at this time and they are agreeable with the plan. I discussed at length with them reasons for immediate return here for re evaluation. They will followup with their primary care physician by calling their office tomorrow. --------------------------------- ADDITIONAL PROVIDER NOTES ---------------------------------  At this time the patient is without objective evidence of an acute process requiring hospitalization or inpatient management. They have remained hemodynamically stable throughout their entire ED visit and are stable for discharge with outpatient follow-up. The plan has been discussed in detail and they are aware of the specific conditions for emergent return, as well as the importance of follow-up. Discharge Medication List as of 7/10/2021  1:07 PM          Diagnosis:  1. Threatened , antepartum    2. Right ovarian cyst        Disposition:  Patient's disposition: Discharge to home  Patient's condition is stable.        Alex Stiles DO  07/10/21 1413

## 2022-02-25 ENCOUNTER — HOSPITAL ENCOUNTER (INPATIENT)
Age: 32
LOS: 2 days | Discharge: HOME | DRG: 560 | End: 2022-02-27
Attending: OBSTETRICS & GYNECOLOGY | Admitting: OBSTETRICS & GYNECOLOGY
Payer: COMMERCIAL

## 2022-02-25 PROBLEM — O42.90 AMNIOTIC FLUID LEAKING: Status: ACTIVE | Noted: 2022-02-25

## 2022-02-25 LAB
ABO/RH: NORMAL
AMNISURE, POC: NEGATIVE
AMPHETAMINE SCREEN, URINE: NOT DETECTED
ANTIBODY SCREEN: NORMAL
BARBITURATE SCREEN URINE: NOT DETECTED
BENZODIAZEPINE SCREEN, URINE: NOT DETECTED
CANNABINOID SCREEN URINE: NOT DETECTED
COCAINE METABOLITE SCREEN URINE: NOT DETECTED
FENTANYL SCREEN, URINE: NOT DETECTED
HCT VFR BLD CALC: 34.6 % (ref 34–48)
HEMOGLOBIN: 11 G/DL (ref 11.5–15.5)
Lab: NORMAL
Lab: NORMAL
MCH RBC QN AUTO: 30.1 PG (ref 26–35)
MCHC RBC AUTO-ENTMCNC: 31.8 % (ref 32–34.5)
MCV RBC AUTO: 94.8 FL (ref 80–99.9)
METHADONE SCREEN, URINE: NOT DETECTED
NEGATIVE QC PASS/FAIL: NORMAL
OPIATE SCREEN URINE: NOT DETECTED
OXYCODONE URINE: NOT DETECTED
PDW BLD-RTO: 17.3 FL (ref 11.5–15)
PHENCYCLIDINE SCREEN URINE: NOT DETECTED
PLATELET # BLD: 203 E9/L (ref 130–450)
PMV BLD AUTO: 11.3 FL (ref 7–12)
POSITIVE QC PASS/FAIL: NORMAL
RBC # BLD: 3.65 E12/L (ref 3.5–5.5)
WBC # BLD: 7.4 E9/L (ref 4.5–11.5)

## 2022-02-25 PROCEDURE — 6360000002 HC RX W HCPCS: Performed by: OBSTETRICS & GYNECOLOGY

## 2022-02-25 PROCEDURE — 85027 COMPLETE CBC AUTOMATED: CPT

## 2022-02-25 PROCEDURE — 80307 DRUG TEST PRSMV CHEM ANLYZR: CPT

## 2022-02-25 PROCEDURE — 86850 RBC ANTIBODY SCREEN: CPT

## 2022-02-25 PROCEDURE — 2580000003 HC RX 258: Performed by: OBSTETRICS & GYNECOLOGY

## 2022-02-25 PROCEDURE — 86901 BLOOD TYPING SEROLOGIC RH(D): CPT

## 2022-02-25 PROCEDURE — 36415 COLL VENOUS BLD VENIPUNCTURE: CPT

## 2022-02-25 PROCEDURE — 1220000001 HC SEMI PRIVATE L&D R&B

## 2022-02-25 PROCEDURE — 99221 1ST HOSP IP/OBS SF/LOW 40: CPT | Performed by: ADVANCED PRACTICE MIDWIFE

## 2022-02-25 PROCEDURE — 84112 EVAL AMNIOTIC FLUID PROTEIN: CPT

## 2022-02-25 PROCEDURE — 86900 BLOOD TYPING SEROLOGIC ABO: CPT

## 2022-02-25 RX ORDER — ACETAMINOPHEN 650 MG
TABLET, EXTENDED RELEASE ORAL
Status: DISPENSED
Start: 2022-02-25 | End: 2022-02-26

## 2022-02-25 RX ORDER — SODIUM CHLORIDE, SODIUM LACTATE, POTASSIUM CHLORIDE, CALCIUM CHLORIDE 600; 310; 30; 20 MG/100ML; MG/100ML; MG/100ML; MG/100ML
INJECTION, SOLUTION INTRAVENOUS CONTINUOUS
Status: DISCONTINUED | OUTPATIENT
Start: 2022-02-25 | End: 2022-02-27 | Stop reason: HOSPADM

## 2022-02-25 RX ORDER — PENICILLIN G 3000000 [IU]/50ML
3 INJECTION, SOLUTION INTRAVENOUS EVERY 4 HOURS
Status: DISCONTINUED | OUTPATIENT
Start: 2022-02-26 | End: 2022-02-27 | Stop reason: HOSPADM

## 2022-02-25 RX ORDER — SODIUM CHLORIDE, SODIUM LACTATE, POTASSIUM CHLORIDE, AND CALCIUM CHLORIDE .6; .31; .03; .02 G/100ML; G/100ML; G/100ML; G/100ML
1000 INJECTION, SOLUTION INTRAVENOUS PRN
Status: DISCONTINUED | OUTPATIENT
Start: 2022-02-25 | End: 2022-02-27 | Stop reason: HOSPADM

## 2022-02-25 RX ORDER — ONDANSETRON 2 MG/ML
4 INJECTION INTRAMUSCULAR; INTRAVENOUS EVERY 6 HOURS PRN
Status: DISCONTINUED | OUTPATIENT
Start: 2022-02-25 | End: 2022-02-27 | Stop reason: HOSPADM

## 2022-02-25 RX ORDER — SODIUM CHLORIDE, SODIUM LACTATE, POTASSIUM CHLORIDE, AND CALCIUM CHLORIDE .6; .31; .03; .02 G/100ML; G/100ML; G/100ML; G/100ML
500 INJECTION, SOLUTION INTRAVENOUS PRN
Status: DISCONTINUED | OUTPATIENT
Start: 2022-02-25 | End: 2022-02-27 | Stop reason: HOSPADM

## 2022-02-25 RX ORDER — LIDOCAINE HYDROCHLORIDE 10 MG/ML
INJECTION, SOLUTION INFILTRATION; PERINEURAL
Status: DISPENSED
Start: 2022-02-25 | End: 2022-02-26

## 2022-02-25 RX ORDER — FERROUS SULFATE 325(65) MG
325 TABLET ORAL
COMMUNITY

## 2022-02-25 RX ADMIN — PENICILLIN G POTASSIUM 5 MILLION UNITS: 5000000 INJECTION, POWDER, FOR SOLUTION INTRAMUSCULAR; INTRAVENOUS at 21:45

## 2022-02-25 RX ADMIN — SODIUM CHLORIDE, POTASSIUM CHLORIDE, SODIUM LACTATE AND CALCIUM CHLORIDE: 600; 310; 30; 20 INJECTION, SOLUTION INTRAVENOUS at 21:44

## 2022-02-25 NOTE — PROGRESS NOTES
presents to L&D at 39 weeks 3 days gestation for rupture of membranes today at 1300   amnisure result negative   Pt admits to + fetal movement     Pt has extensive birth plan and would like to speak with a pt advocate   Dr. Angelika Valdez spoke with Dr. Augusta Wolfe and pt may walk recheck in 2 hours for plan of care

## 2022-02-25 NOTE — H&P
CHIEF COMPLAINT:  contractions, leakage of amniotic fluid, Did bring their birth plan with them. HISTORY OF PRESENT ILLNESS:      The patient is a 36 y.o. female at 38w3d. OB History        1    Para        Term                AB        Living           SAB        IAB        Ectopic        Molar        Multiple   1    Live Births                Patient presents with a chief complaint as above and is being admitted for observation    Estimated Due Date: Estimated Date of Delivery: 3/1/22    PRENATAL CARE:    Complicated by: anemia    PAST OB HISTORY  OB History        1    Para        Term                AB        Living           SAB        IAB        Ectopic        Molar        Multiple   1    Live Births                    Past Medical History:        Diagnosis Date    Abnormal Pap smear of cervix     colposcopy     Anemia      Past Surgical History:        Procedure Laterality Date    DILATION AND CURETTAGE OF UTERUS      DILATION AND CURETTAGE OF UTERUS N/A 3/3/2021    SUCTION DILATATION AND CURETTAGE performed by Bob [facility] at [facility] OR    ECHO [facility] DOP COLOR FLOW  2012         SINUS SURGERY      for epistaxis     Allergies:  Patient has no known allergies.   Social History:    Social History     Socioeconomic History    Marital status:      Spouse name: Not on file    Number of children: Not on file    Years of education: Not on file    Highest education level: Not on file   Occupational History    Not on file   Tobacco Use    Smoking status: Never Smoker    Smokeless tobacco: Never Used   Substance and Sexual Activity    Alcohol use: Not Currently     Comment: ONCE OR TWICE A MONTH    Drug use: No    Sexual activity: Never   Other Topics Concern    Not on file   Social History Narrative    ** Merged History Encounter **          Social Determinants of Health     Financial Resource Strain:     Difficulty of Paying Living Expenses: Not on file   Food Insecurity:     Worried About [facility] in the Last Year: Not on file    Robby of Food in the Last Year: Not on file   Transportation Needs:     Lack of Transportation (Medical): Not on file    Lack of Transportation (Non-Medical):  Not on file   Physical Activity:     Days of Exercise per Week: Not on file    Minutes of Exercise per Session: Not on file   Stress:     Feeling of Stress : Not on file   Social Connections:     Frequency of Communication with Friends and Family: Not on file    Frequency of Social Gatherings with Friends and Family: Not on file    Attends Voodoo Services: Not on file    Active Member of [address] [place] [address] or Organizations: Not on file    Attends Club or Organization Meetings: Not on file    Marital Status: Not on file   Intimate Partner Violence:     Fear of Current or Ex-Partner: Not on file    Emotionally Abused: Not on file    Physically Abused: Not on file    Sexually Abused: Not on file   Housing Stability:     Unable to Pay for Housing in the Last Year: Not on file    Number of [place] in the Last Year: Not on file    Unstable Housing in the Last Year: Not on file     Family History:       Problem Relation Age of Onset    Heart Disease Maternal Grandmother     High Blood Pressure Maternal Grandmother     Diabetes Maternal Grandmother      Medications Prior to Admission:  Medications Prior to Admission: ferrous sulfate (IRON 325) 325 (65 Fe) MG tablet, Take 325 mg by mouth daily (with breakfast)  Prenatal Vit-Fe Fumarate-FA (PRENATAL 1 PLUS 1 PO), Take by mouth  ibuprofen (ADVIL;MOTRIN) 600 MG tablet, Take 1 tablet by mouth every 6 hours as needed for Pain    REVIEW OF SYSTEMS:    CONSTITUTIONAL:  negative  RESPIRATORY:  negative  CARDIOVASCULAR:  negative  GASTROINTESTINAL:  negative  ALLERGIC/IMMUNOLOGIC:  negative  NEUROLOGICAL:  negative  BEHAVIOR/PSYCH:  negative    PHYSICAL EXAM:  Vitals:    02/25/22 1810   Weight: 238 lb (108 kg) Height: 5' 10.5\" (1.791 m)     General appearance:  awake, alert, cooperative, no apparent distress, and appears stated age  Neurologic:  Awake, alert, oriented to name, place and time. Lungs:  No increased work of breathing, good air exchange  Abdomen:  Soft, non tender, gravid, consistent with her gestational age, EFW by Leopald's manouever was aga   Fetal heart rate:  Reassuring.   Pelvis:  Adequate pelvis  Cervix: 3 cm 80 soft -3  Contraction frequency:  3-5 minutes    Membranes:  Intact    ASSESSMENT AND PLAN:  Rule out SROM (amniosure neg)  Rule out labor    Dr Nirali Max spoke to Dr Alyce Diane  Will let patient ambulate and will recheck in 2 hours    Tra Calhoun, [facility]

## 2022-02-26 PROCEDURE — 6370000000 HC RX 637 (ALT 250 FOR IP): Performed by: OBSTETRICS & GYNECOLOGY

## 2022-02-26 PROCEDURE — 0KQM0ZZ REPAIR PERINEUM MUSCLE, OPEN APPROACH: ICD-10-PCS | Performed by: OBSTETRICS & GYNECOLOGY

## 2022-02-26 PROCEDURE — 7200000001 HC VAGINAL DELIVERY

## 2022-02-26 PROCEDURE — 1220000000 HC SEMI PRIVATE OB R&B

## 2022-02-26 RX ORDER — SODIUM CHLORIDE 0.9 % (FLUSH) 0.9 %
5-40 SYRINGE (ML) INJECTION PRN
Status: DISCONTINUED | OUTPATIENT
Start: 2022-02-26 | End: 2022-02-27 | Stop reason: HOSPADM

## 2022-02-26 RX ORDER — DOCUSATE SODIUM 100 MG/1
100 CAPSULE, LIQUID FILLED ORAL 2 TIMES DAILY
Status: DISCONTINUED | OUTPATIENT
Start: 2022-02-26 | End: 2022-02-27 | Stop reason: HOSPADM

## 2022-02-26 RX ORDER — SODIUM CHLORIDE, SODIUM LACTATE, POTASSIUM CHLORIDE, CALCIUM CHLORIDE 600; 310; 30; 20 MG/100ML; MG/100ML; MG/100ML; MG/100ML
INJECTION, SOLUTION INTRAVENOUS CONTINUOUS
Status: DISCONTINUED | OUTPATIENT
Start: 2022-02-26 | End: 2022-02-27 | Stop reason: HOSPADM

## 2022-02-26 RX ORDER — SIMETHICONE 80 MG
80 TABLET,CHEWABLE ORAL EVERY 6 HOURS PRN
Status: DISCONTINUED | OUTPATIENT
Start: 2022-02-26 | End: 2022-02-27 | Stop reason: HOSPADM

## 2022-02-26 RX ORDER — SODIUM CHLORIDE 0.9 % (FLUSH) 0.9 %
5-40 SYRINGE (ML) INJECTION EVERY 12 HOURS SCHEDULED
Status: DISCONTINUED | OUTPATIENT
Start: 2022-02-26 | End: 2022-02-27 | Stop reason: HOSPADM

## 2022-02-26 RX ORDER — HYDROCODONE BITARTRATE AND ACETAMINOPHEN 5; 325 MG/1; MG/1
1 TABLET ORAL EVERY 4 HOURS PRN
Status: DISCONTINUED | OUTPATIENT
Start: 2022-02-26 | End: 2022-02-27 | Stop reason: HOSPADM

## 2022-02-26 RX ORDER — FERROUS SULFATE 325(65) MG
325 TABLET ORAL 2 TIMES DAILY WITH MEALS
Status: DISCONTINUED | OUTPATIENT
Start: 2022-02-26 | End: 2022-02-27 | Stop reason: HOSPADM

## 2022-02-26 RX ORDER — ACETAMINOPHEN 325 MG/1
650 TABLET ORAL EVERY 4 HOURS PRN
Status: DISCONTINUED | OUTPATIENT
Start: 2022-02-26 | End: 2022-02-27 | Stop reason: HOSPADM

## 2022-02-26 RX ORDER — IBUPROFEN 600 MG/1
600 TABLET ORAL EVERY 6 HOURS PRN
Status: DISCONTINUED | OUTPATIENT
Start: 2022-02-26 | End: 2022-02-27 | Stop reason: HOSPADM

## 2022-02-26 RX ORDER — ONDANSETRON 4 MG/1
4 TABLET, ORALLY DISINTEGRATING ORAL EVERY 8 HOURS PRN
Status: DISCONTINUED | OUTPATIENT
Start: 2022-02-26 | End: 2022-02-27 | Stop reason: HOSPADM

## 2022-02-26 RX ORDER — SODIUM CHLORIDE 9 MG/ML
25 INJECTION, SOLUTION INTRAVENOUS PRN
Status: DISCONTINUED | OUTPATIENT
Start: 2022-02-26 | End: 2022-02-27 | Stop reason: HOSPADM

## 2022-02-26 RX ORDER — HYDROCODONE BITARTRATE AND ACETAMINOPHEN 5; 325 MG/1; MG/1
2 TABLET ORAL EVERY 4 HOURS PRN
Status: DISCONTINUED | OUTPATIENT
Start: 2022-02-26 | End: 2022-02-27 | Stop reason: HOSPADM

## 2022-02-26 RX ORDER — MODIFIED LANOLIN
OINTMENT (GRAM) TOPICAL PRN
Status: DISCONTINUED | OUTPATIENT
Start: 2022-02-26 | End: 2022-02-27 | Stop reason: HOSPADM

## 2022-02-26 RX ADMIN — IBUPROFEN 600 MG: 600 TABLET ORAL at 20:06

## 2022-02-26 RX ADMIN — Medication: at 08:58

## 2022-02-26 RX ADMIN — DOCUSATE SODIUM 100 MG: 100 CAPSULE, LIQUID FILLED ORAL at 20:06

## 2022-02-26 RX ADMIN — DOCUSATE SODIUM 100 MG: 100 CAPSULE, LIQUID FILLED ORAL at 08:58

## 2022-02-26 ASSESSMENT — PAIN SCALES - GENERAL: PAINLEVEL_OUTOF10: 1

## 2022-02-26 NOTE — PROGRESS NOTES
Assumed pt care. Birth plan reviewed with pt in detail. Pt would like as natural birth as possible with no intervention of AROM or Pitocin unless absolutely necessary. Explain to pt that we will recheck her in 2 hours and see if cervical change is being made with contractions. If pt does stay, would like number to speak to pt advocate because she would like her mother in law to be here during her labor and birth experience. Pt placed on EFM at 1936.  Will be taken off at 2006 to walk for 30 minutes and then recheck cervix Never smoker

## 2022-02-26 NOTE — FLOWSHEET NOTE
Patient admitted into room and oriented to surroundings. Introduced self and wrote this RN's name and phone extension on patient's white board. Phone and nurse's call light at patient's bedside and instructed to use for any needs. Patient instructed on new admission informational packet at bedside with information on infant testing to be done when infant is 24 hours old including [address] labs, 24 hours blood sugar, and CCHD. Patient instructed on mom baby unit policies and procedures including need to keep infant in bassinet for transport in hallways and for infant to sleep alone, on back, in an empty bassinet. Patient also instructed patient on unit visitation policy and that one same support person 25years old or older may stay overnight if desired. Patient verbalized understanding of all of the above. Patient declined flu vaccine and TDAP vaccine. Patient is ambulating around the room and hallway well, no dizziness and she is steady. Nurse was present for patient while using the restroom, she is voiding adequately and noe-care provided.

## 2022-02-26 NOTE — PROGRESS NOTES
Dr Erin Salazar updated pt is 6/90/-2. Contractions Q2-4 minutes, coping very well. Pt is GBS positive.  Will admit and start PCN

## 2022-02-26 NOTE — PROGRESS NOTES
Dr Victoria Gruber updated pt has an anterior lip but is starting to push involuntarily.  States he is on his way in

## 2022-02-26 NOTE — PROGRESS NOTES
Dr Isis Estrada updated pt is 9/100/0. Pt is working though ctx well.  Will call when ready for delivery

## 2022-02-26 NOTE — PROCEDURES
Department of Obstetrics and Gynecology  VAGINAL DELIVERY  Procedure Note      Gestational Status:  Term pregnancy, Spontaneous labor and Single fetus     Anesthesia:  Local    Delivery Summary:      Delivery Type: normal spontaneous vaginal delivery  at term    Gender: Female infant. Weight:  7 lbs. ,  0 oz. Birth Time: 2352    Apgars: (9 - 9)    Remarks:    No Episiotomy. Posterior Midline Second Degree Laceration, Repaired with 2-0 Chromic    Nuchal Cord: was not present    Disposition:  Floor. Estimated blood loss: 350 cc. Condition:  infant stable to general nursery and mother stable    Attending Attestation: I performed the procedure.     Yuki Lerma MD, [facility]

## 2022-02-26 NOTE — PROGRESS NOTES
of baby girl at 0 by Dr Moi Ramon. Delayed cord clamping performed. APGARs 9/9. Skin to skin initiated immediately.  Mother and baby now resting and bonding well

## 2022-02-26 NOTE — PLAN OF CARE
Problem: Constipation:  Goal: Bowel elimination is within specified parameters  Description: Bowel elimination is within specified parameters  Outcome: Ongoing     Problem: Fluid Volume - Imbalance:  Goal: Absence of imbalanced fluid volume signs and symptoms  Description: Absence of imbalanced fluid volume signs and symptoms  Outcome: Ongoing  Goal: Absence of postpartum hemorrhage signs and symptoms  Description: Absence of postpartum hemorrhage signs and symptoms  Outcome: Ongoing     Problem: Pain - Acute:  Goal: Pain level will decrease  Description: Pain level will decrease  Outcome: Ongoing

## 2022-02-26 NOTE — LACTATION NOTE
Instructed on normal infant behavior in the first 12-24 hrs, benefits of skin to skin and components of safe positioning, encouraged rooming-in and avoidance of pacifier use until breastfeeding is well established. Reviewed latch techniques, positioning, signs of effective milk transfer, waking techniques and the importance of frequent feedings- 8-12 times/ 24 hrs to stimulate/maintain milk production. Taught hand expression and encouraged to express drops of colostrum at start of feeding. Reviewed feeding cues and expected urine/stool output and transition. Encouraged to feed infant as often and for as long as the infant wishes to do so. Reviewed [place] learning ping. Taught hand expression. Assisted with positioning and latch. Mom admits to nipple pain. Encouraged her to use her breast milk to heal sore nipples. Mom is requesting a double electric breast pump for home use. Offered support and encouraged to call for assistance or concerns.

## 2022-02-27 VITALS
HEART RATE: 80 BPM | HEIGHT: 71 IN | DIASTOLIC BLOOD PRESSURE: 74 MMHG | TEMPERATURE: 98.6 F | BODY MASS INDEX: 33.32 KG/M2 | RESPIRATION RATE: 18 BRPM | SYSTOLIC BLOOD PRESSURE: 116 MMHG | OXYGEN SATURATION: 99 % | WEIGHT: 238 LBS

## 2022-02-27 PROBLEM — O03.4 INCOMPLETE ABORTION: Status: RESOLVED | Noted: 2021-03-03 | Resolved: 2022-02-27

## 2022-02-27 LAB
HCT VFR BLD CALC: 29.6 % (ref 34–48)
HEMOGLOBIN: 9.2 G/DL (ref 11.5–15.5)
MCH RBC QN AUTO: 30.3 PG (ref 26–35)
MCHC RBC AUTO-ENTMCNC: 31.1 % (ref 32–34.5)
MCV RBC AUTO: 97.4 FL (ref 80–99.9)
PDW BLD-RTO: 17.6 FL (ref 11.5–15)
PLATELET # BLD: 173 E9/L (ref 130–450)
PMV BLD AUTO: 10.8 FL (ref 7–12)
RBC # BLD: 3.04 E12/L (ref 3.5–5.5)
WBC # BLD: 8.7 E9/L (ref 4.5–11.5)

## 2022-02-27 PROCEDURE — 6370000000 HC RX 637 (ALT 250 FOR IP): Performed by: OBSTETRICS & GYNECOLOGY

## 2022-02-27 PROCEDURE — 85027 COMPLETE CBC AUTOMATED: CPT

## 2022-02-27 PROCEDURE — 36415 COLL VENOUS BLD VENIPUNCTURE: CPT

## 2022-02-27 RX ORDER — IBUPROFEN 600 MG/1
600 TABLET ORAL EVERY 6 HOURS PRN
Qty: 60 TABLET | Refills: 1 | Status: SHIPPED | OUTPATIENT
Start: 2022-02-27

## 2022-02-27 RX ORDER — DOCUSATE SODIUM 100 MG/1
100 CAPSULE, LIQUID FILLED ORAL 2 TIMES DAILY
Qty: 60 CAPSULE | Refills: 1 | Status: SHIPPED | OUTPATIENT
Start: 2022-02-27

## 2022-02-27 RX ADMIN — IBUPROFEN 600 MG: 600 TABLET ORAL at 08:36

## 2022-02-27 RX ADMIN — DOCUSATE SODIUM 100 MG: 100 CAPSULE, LIQUID FILLED ORAL at 08:36

## 2022-02-27 RX ADMIN — FERROUS SULFATE TAB 325 MG (65 MG ELEMENTAL FE) 325 MG: 325 (65 FE) TAB at 08:36

## 2022-02-27 ASSESSMENT — PAIN SCALES - GENERAL: PAINLEVEL_OUTOF10: 1

## 2022-02-27 NOTE — PROGRESS NOTES
Discharge instructions given to pt w/ understanding verbalized; Rx for motrin and colace handed to pt; pt denies any questions.

## 2022-02-27 NOTE — LACTATION NOTE
Assisted mom with positioning and latch using the football hold. Mom admits to nipple pain on both sides. Encouraged mom to support her breast using the \"C\" hold throughout the feeding. Also reminded mom to use her own breast milk to help heal sore nipples. Gave mom a hand held breast pump per request.  Demonstrated use and care. Encouraged mom to try the pump prior to discharge to let me know if the flange size is correct.

## 2022-02-27 NOTE — PROGRESS NOTES
Department of Obstetrics and Gynecology  Labor and Delivery  Attending Post Partum Progress Note      SUBJECTIVE:  No Complaints    OBJECTIVE:     Vitals:  BP (!) 143/70   Pulse 80   Temp 98.6 °F (37 °C) (Oral)   Resp 18   Ht 5' 10.5\" (1.791 m)   Wt 238 lb (108 kg)   LMP 05/25/2021   SpO2 99%   Breastfeeding Unknown   BMI 33.67 kg/m²     Uterus:  Firm    DATA:      CBC:   Lab Results   Component Value Date    WBC 8.7 02/27/2022    RBC 3.04 02/27/2022    HGB 9.2 02/27/2022    HCT 29.6 02/27/2022    MCV 97.4 02/27/2022    RDW 17.6 02/27/2022     02/27/2022       ASSESSMENT: PPD #2    PLAN: Home on motrin and Colace. Office in 6 weeks.     Zach Bradley MD, [facility] [facility]

## 2022-02-27 NOTE — DISCHARGE SUMMARY
Department of Obstetrics & Gynecology  OBSTETRICAL  VAGINAL DELIVERY  DISCHARGE SUMMARY    Malina Fleming is a 36y.o. year old  female. JOLANTA: Estimated Date of Delivery: 3/1/22     Gestational Age: 39w3d    Admitted on: 2022     Admitting Diagnosis: Amniotic fluid leaking [O42.90]  39 weeks gestation of pregnancy [Z3A.39]    PAST OB HISTORY  OB History        1    Para   1    Term   1            AB        Living   1       SAB        IAB        Ectopic        Molar        Multiple   0    Live Births   1                Antepartum complications: None     Date of Delivery:   Information for the patient's :  Earxuan Dallas [[id number]]   2022        Delivery Type:    Information for the patient's :  Mariama Mercedes [[id number]]   Delivery Method: Vaginal, Spontaneous       Anesthesia: Local     Information:       GENDER:   Information for the patient's :  Mariama Mercedes [[id number]]   female      BIRTH WEIGHT:   Information for the patient's :  Mariama Mercedes [[id number]]   Birth Weight: 7 lb 0.5 oz (3.19 kg)      APGARS:   Information for the patient's :  Mariama Mercedes [[id number]]   APGAR One: 5      Information for the patient's :  Mariama Mercedes [[id number]]   APGAR Five: 9          Intrapartum complications: None    Delivered By: Chino Beaulieu MD    Placenta: spontaneous    [address] [facility] Course/Complications: Uneventful     Discharge Date: 22     Discharge Medications:      Medication List      START taking these medications    docusate sodium 100 MG capsule  Commonly known as: COLACE  Take 1 capsule by mouth 2 times daily        CONTINUE taking these medications    ferrous sulfate 325 (65 Fe) MG tablet  Commonly known as: IRON 325     ibuprofen 600 MG tablet  Commonly known as: ADVIL;MOTRIN  Take 1 tablet by mouth every 6 hours as needed for Pain PRENATAL 1 PLUS 1 PO           Where to Get Your Medications      You can get these medications from any pharmacy    Bring a paper prescription for each of these medications  · docusate sodium 100 MG capsule  · ibuprofen 600 MG tablet         Follow-up Plan:  Appointment with Taniya Scruggs MD, MD in 6 weeks.     Taniya Scruggs MD, [facility]  Obstetrics & Gynecology

## 2022-10-25 ENCOUNTER — APPOINTMENT (OUTPATIENT)
Dept: GENERAL RADIOLOGY | Age: 32
End: 2022-10-25
Payer: OTHER MISCELLANEOUS

## 2022-10-25 ENCOUNTER — HOSPITAL ENCOUNTER (EMERGENCY)
Age: 32
Discharge: HOME | End: 2022-10-25
Payer: OTHER MISCELLANEOUS

## 2022-10-25 VITALS
OXYGEN SATURATION: 98 % | TEMPERATURE: 98 F | HEART RATE: 66 BPM | DIASTOLIC BLOOD PRESSURE: 88 MMHG | RESPIRATION RATE: 18 BRPM | BODY MASS INDEX: 29 KG/M2 | WEIGHT: 205 LBS | SYSTOLIC BLOOD PRESSURE: 126 MMHG
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
## 2022-10-25 DIAGNOSIS — M47.896 OTHER OSTEOARTHRITIS OF SPINE, LUMBAR REGION: ICD-10-CM

## 2022-10-25 DIAGNOSIS — S16.1XXA ACUTE STRAIN OF NECK MUSCLE, INITIAL ENCOUNTER: Primary | ICD-10-CM

## 2022-10-25 LAB — HCG(URINE) PREGNANCY TEST: NEGATIVE

## 2022-10-25 PROCEDURE — 73110 X-RAY EXAM OF WRIST: CPT

## 2022-10-25 PROCEDURE — 81025 URINE PREGNANCY TEST: CPT

## 2022-10-25 PROCEDURE — 72050 X-RAY EXAM NECK SPINE 4/5VWS: CPT

## 2022-10-25 PROCEDURE — 6370000000 HC RX 637 (ALT 250 FOR IP): Performed by: NURSE PRACTITIONER

## 2022-10-25 PROCEDURE — 99284 EMERGENCY DEPT VISIT MOD MDM: CPT

## 2022-10-25 PROCEDURE — 72100 X-RAY EXAM L-S SPINE 2/3 VWS: CPT

## 2022-10-25 PROCEDURE — 72072 X-RAY EXAM THORAC SPINE 3VWS: CPT

## 2022-10-25 RX ORDER — ACETAMINOPHEN 325 MG/1
650 TABLET ORAL ONCE
Status: COMPLETED | OUTPATIENT
Start: 2022-10-25 | End: 2022-10-25

## 2022-10-25 RX ORDER — ACETAMINOPHEN 500 MG
500 TABLET ORAL 4 TIMES DAILY PRN
Qty: 20 TABLET | Refills: 0 | Status: SHIPPED | OUTPATIENT
Start: 2022-10-25

## 2022-10-25 RX ADMIN — ACETAMINOPHEN 650 MG: 325 TABLET ORAL at 12:17

## 2022-10-25 ASSESSMENT — PAIN DESCRIPTION - DESCRIPTORS
DESCRIPTORS: DISCOMFORT;SORE
DESCRIPTORS: ACHING;BURNING

## 2022-10-25 ASSESSMENT — PAIN DESCRIPTION - ORIENTATION: ORIENTATION: RIGHT

## 2022-10-25 ASSESSMENT — PAIN SCALES - GENERAL
PAINLEVEL_OUTOF10: 4
PAINLEVEL_OUTOF10: 2
PAINLEVEL_OUTOF10: 10

## 2022-10-25 ASSESSMENT — PAIN - FUNCTIONAL ASSESSMENT
PAIN_FUNCTIONAL_ASSESSMENT: 0-10
PAIN_FUNCTIONAL_ASSESSMENT: PREVENTS OR INTERFERES SOME ACTIVE ACTIVITIES AND ADLS

## 2022-10-25 ASSESSMENT — PAIN DESCRIPTION - LOCATION
LOCATION: BACK;NECK
LOCATION: WRIST;NECK

## 2022-10-25 ASSESSMENT — PAIN DESCRIPTION - PAIN TYPE: TYPE: ACUTE PAIN
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
## 2023-08-15 ENCOUNTER — HOSPITAL ENCOUNTER (INPATIENT)
Age: 33
LOS: 1 days | Discharge: HOME | DRG: 560 | End: 2023-08-18
Attending: OBSTETRICS & GYNECOLOGY | Admitting: OBSTETRICS & GYNECOLOGY
Payer: COMMERCIAL

## 2023-08-15 LAB
ERYTHROCYTE [DISTWIDTH] IN BLOOD BY AUTOMATED COUNT: 14.2 % (ref 11.5–15)
HCT VFR BLD AUTO: 31.9 % (ref 34–48)
HGB BLD-MCNC: 10.7 G/DL (ref 11.5–15.5)
MCH RBC QN AUTO: 30.9 PG (ref 26–35)
MCHC RBC AUTO-ENTMCNC: 33.5 G/DL (ref 32–34.5)
MCV RBC AUTO: 92.2 FL (ref 80–99.9)
PLATELET # BLD AUTO: 206 K/UL (ref 130–450)
PMV BLD AUTO: 10.5 FL (ref 7–12)
RBC # BLD AUTO: 3.46 M/UL (ref 3.5–5.5)
WBC OTHER # BLD: 10.1 K/UL (ref 4.5–11.5)

## 2023-08-15 PROCEDURE — 86900 BLOOD TYPING SEROLOGIC ABO: CPT

## 2023-08-15 PROCEDURE — 86850 RBC ANTIBODY SCREEN: CPT

## 2023-08-15 PROCEDURE — 6360000002 HC RX W HCPCS: Performed by: STUDENT IN AN ORGANIZED HEALTH CARE EDUCATION/TRAINING PROGRAM

## 2023-08-15 PROCEDURE — 86901 BLOOD TYPING SEROLOGIC RH(D): CPT

## 2023-08-15 PROCEDURE — 1220000001 HC SEMI PRIVATE L&D R&B

## 2023-08-15 PROCEDURE — 85027 COMPLETE CBC AUTOMATED: CPT

## 2023-08-15 PROCEDURE — 2580000003 HC RX 258: Performed by: STUDENT IN AN ORGANIZED HEALTH CARE EDUCATION/TRAINING PROGRAM

## 2023-08-15 PROCEDURE — 99222 1ST HOSP IP/OBS MODERATE 55: CPT | Performed by: STUDENT IN AN ORGANIZED HEALTH CARE EDUCATION/TRAINING PROGRAM

## 2023-08-15 RX ORDER — SODIUM CHLORIDE 0.9 % (FLUSH) 0.9 %
5-40 SYRINGE (ML) INJECTION EVERY 12 HOURS SCHEDULED
Status: DISCONTINUED | OUTPATIENT
Start: 2023-08-15 | End: 2023-08-18 | Stop reason: HOSPADM

## 2023-08-15 RX ORDER — SODIUM CHLORIDE, SODIUM LACTATE, POTASSIUM CHLORIDE, AND CALCIUM CHLORIDE .6; .31; .03; .02 G/100ML; G/100ML; G/100ML; G/100ML
500 INJECTION, SOLUTION INTRAVENOUS PRN
Status: DISCONTINUED | OUTPATIENT
Start: 2023-08-15 | End: 2023-08-18 | Stop reason: HOSPADM

## 2023-08-15 RX ORDER — ONDANSETRON 2 MG/ML
4 INJECTION INTRAMUSCULAR; INTRAVENOUS EVERY 6 HOURS PRN
Status: DISCONTINUED | OUTPATIENT
Start: 2023-08-15 | End: 2023-08-18 | Stop reason: HOSPADM

## 2023-08-15 RX ORDER — LIDOCAINE HYDROCHLORIDE 10 MG/ML
INJECTION, SOLUTION INFILTRATION; PERINEURAL
Status: DISPENSED
Start: 2023-08-15 | End: 2023-08-16

## 2023-08-15 RX ORDER — CARBOPROST TROMETHAMINE 250 UG/ML
250 INJECTION, SOLUTION INTRAMUSCULAR PRN
Status: DISCONTINUED | OUTPATIENT
Start: 2023-08-15 | End: 2023-08-18 | Stop reason: HOSPADM

## 2023-08-15 RX ORDER — SODIUM CHLORIDE, SODIUM LACTATE, POTASSIUM CHLORIDE, CALCIUM CHLORIDE 600; 310; 30; 20 MG/100ML; MG/100ML; MG/100ML; MG/100ML
INJECTION, SOLUTION INTRAVENOUS CONTINUOUS
Status: DISCONTINUED | OUTPATIENT
Start: 2023-08-15 | End: 2023-08-18 | Stop reason: HOSPADM

## 2023-08-15 RX ORDER — MISOPROSTOL 200 UG/1
800 TABLET ORAL PRN
Status: DISCONTINUED | OUTPATIENT
Start: 2023-08-15 | End: 2023-08-18 | Stop reason: HOSPADM

## 2023-08-15 RX ORDER — DOCUSATE SODIUM 100 MG/1
100 CAPSULE, LIQUID FILLED ORAL 2 TIMES DAILY
Status: DISCONTINUED | OUTPATIENT
Start: 2023-08-15 | End: 2023-08-16 | Stop reason: SDUPTHER

## 2023-08-15 RX ORDER — SODIUM CHLORIDE 0.9 % (FLUSH) 0.9 %
5-40 SYRINGE (ML) INJECTION PRN
Status: DISCONTINUED | OUTPATIENT
Start: 2023-08-15 | End: 2023-08-18 | Stop reason: HOSPADM

## 2023-08-15 RX ORDER — SODIUM CHLORIDE 9 MG/ML
25 INJECTION, SOLUTION INTRAVENOUS PRN
Status: DISCONTINUED | OUTPATIENT
Start: 2023-08-15 | End: 2023-08-18 | Stop reason: HOSPADM

## 2023-08-15 RX ORDER — METHYLERGONOVINE MALEATE 0.2 MG/ML
200 INJECTION INTRAVENOUS PRN
Status: DISCONTINUED | OUTPATIENT
Start: 2023-08-15 | End: 2023-08-18 | Stop reason: HOSPADM

## 2023-08-15 RX ORDER — SODIUM CHLORIDE, SODIUM LACTATE, POTASSIUM CHLORIDE, AND CALCIUM CHLORIDE .6; .31; .03; .02 G/100ML; G/100ML; G/100ML; G/100ML
1000 INJECTION, SOLUTION INTRAVENOUS PRN
Status: DISCONTINUED | OUTPATIENT
Start: 2023-08-15 | End: 2023-08-18 | Stop reason: HOSPADM

## 2023-08-15 RX ORDER — ACETAMINOPHEN 650 MG
TABLET, EXTENDED RELEASE ORAL
Status: DISPENSED
Start: 2023-08-15 | End: 2023-08-16

## 2023-08-15 RX ADMIN — DEXTROSE MONOHYDRATE 5 MILLION UNITS: 50 INJECTION, SOLUTION INTRAVENOUS at 23:20

## 2023-08-16 LAB
ABO + RH BLD: NORMAL
AMPHET UR QL SCN: NEGATIVE
ARM BAND NUMBER: NORMAL
BARBITURATES UR QL SCN: NEGATIVE
BENZODIAZ UR QL: NEGATIVE
BLOOD BANK SAMPLE EXPIRATION: NORMAL
BLOOD GROUP ANTIBODIES SERPL: NEGATIVE
BUPRENORPHINE UR QL: NEGATIVE
CANNABINOIDS UR QL SCN: NEGATIVE
COCAINE UR QL SCN: NEGATIVE
FENTANYL UR QL: NEGATIVE
METHADONE UR QL: NEGATIVE
OPIATES UR QL SCN: NEGATIVE
OXYCODONE UR QL SCN: NEGATIVE
PCP UR QL SCN: NEGATIVE
TEST INFORMATION: NORMAL

## 2023-08-16 PROCEDURE — 87591 N.GONORRHOEAE DNA AMP PROB: CPT

## 2023-08-16 PROCEDURE — 88307 TISSUE EXAM BY PATHOLOGIST: CPT

## 2023-08-16 PROCEDURE — 10907ZC DRAINAGE OF AMNIOTIC FLUID, THERAPEUTIC FROM PRODUCTS OF CONCEPTION, VIA NATURAL OR ARTIFICIAL OPENING: ICD-10-PCS | Performed by: OBSTETRICS & GYNECOLOGY

## 2023-08-16 PROCEDURE — 7200000001 HC VAGINAL DELIVERY

## 2023-08-16 PROCEDURE — 6360000002 HC RX W HCPCS: Performed by: STUDENT IN AN ORGANIZED HEALTH CARE EDUCATION/TRAINING PROGRAM

## 2023-08-16 PROCEDURE — 80307 DRUG TEST PRSMV CHEM ANLYZR: CPT

## 2023-08-16 PROCEDURE — 6370000000 HC RX 637 (ALT 250 FOR IP): Performed by: OBSTETRICS & GYNECOLOGY

## 2023-08-16 PROCEDURE — 87491 CHLMYD TRACH DNA AMP PROBE: CPT

## 2023-08-16 RX ORDER — OXYCODONE HYDROCHLORIDE 5 MG/1
5 TABLET ORAL EVERY 6 HOURS PRN
Status: DISCONTINUED | OUTPATIENT
Start: 2023-08-16 | End: 2023-08-18 | Stop reason: HOSPADM

## 2023-08-16 RX ORDER — SODIUM CHLORIDE 9 MG/ML
INJECTION, SOLUTION INTRAVENOUS PRN
Status: DISCONTINUED | OUTPATIENT
Start: 2023-08-16 | End: 2023-08-18 | Stop reason: HOSPADM

## 2023-08-16 RX ORDER — SIMETHICONE 80 MG
80 TABLET,CHEWABLE ORAL EVERY 6 HOURS PRN
Status: DISCONTINUED | OUTPATIENT
Start: 2023-08-16 | End: 2023-08-18 | Stop reason: HOSPADM

## 2023-08-16 RX ORDER — SODIUM CHLORIDE 0.9 % (FLUSH) 0.9 %
5-40 SYRINGE (ML) INJECTION EVERY 12 HOURS SCHEDULED
Status: DISCONTINUED | OUTPATIENT
Start: 2023-08-16 | End: 2023-08-18 | Stop reason: HOSPADM

## 2023-08-16 RX ORDER — IBUPROFEN 600 MG/1
600 TABLET ORAL EVERY 6 HOURS PRN
Status: DISCONTINUED | OUTPATIENT
Start: 2023-08-16 | End: 2023-08-18 | Stop reason: HOSPADM

## 2023-08-16 RX ORDER — MODIFIED LANOLIN
OINTMENT (GRAM) TOPICAL PRN
Status: DISCONTINUED | OUTPATIENT
Start: 2023-08-16 | End: 2023-08-18 | Stop reason: HOSPADM

## 2023-08-16 RX ORDER — FERROUS SULFATE 325(65) MG
325 TABLET ORAL 2 TIMES DAILY WITH MEALS
Status: DISCONTINUED | OUTPATIENT
Start: 2023-08-16 | End: 2023-08-18 | Stop reason: HOSPADM

## 2023-08-16 RX ORDER — ONDANSETRON 4 MG/1
4 TABLET, ORALLY DISINTEGRATING ORAL EVERY 8 HOURS PRN
Status: DISCONTINUED | OUTPATIENT
Start: 2023-08-16 | End: 2023-08-18 | Stop reason: HOSPADM

## 2023-08-16 RX ORDER — SODIUM CHLORIDE, SODIUM LACTATE, POTASSIUM CHLORIDE, CALCIUM CHLORIDE 600; 310; 30; 20 MG/100ML; MG/100ML; MG/100ML; MG/100ML
INJECTION, SOLUTION INTRAVENOUS CONTINUOUS
Status: DISCONTINUED | OUTPATIENT
Start: 2023-08-16 | End: 2023-08-18 | Stop reason: HOSPADM

## 2023-08-16 RX ORDER — DOCUSATE SODIUM 100 MG/1
100 CAPSULE, LIQUID FILLED ORAL 2 TIMES DAILY
Status: DISCONTINUED | OUTPATIENT
Start: 2023-08-16 | End: 2023-08-18 | Stop reason: HOSPADM

## 2023-08-16 RX ORDER — SODIUM CHLORIDE 0.9 % (FLUSH) 0.9 %
5-40 SYRINGE (ML) INJECTION PRN
Status: DISCONTINUED | OUTPATIENT
Start: 2023-08-16 | End: 2023-08-18 | Stop reason: HOSPADM

## 2023-08-16 RX ADMIN — Medication 87.3 MILLI-UNITS/MIN: at 00:47

## 2023-08-16 RX ADMIN — DOCUSATE SODIUM 100 MG: 100 CAPSULE, LIQUID FILLED ORAL at 08:20

## 2023-08-16 RX ADMIN — DOCUSATE SODIUM 100 MG: 100 CAPSULE, LIQUID FILLED ORAL at 23:22

## 2023-08-16 RX ADMIN — IBUPROFEN 600 MG: 600 TABLET ORAL at 08:20

## 2023-08-16 RX ADMIN — IBUPROFEN 600 MG: 600 TABLET ORAL at 18:18

## 2023-08-16 RX ADMIN — Medication: at 08:20

## 2023-08-16 RX ADMIN — Medication 166.7 ML: at 00:36

## 2023-08-16 ASSESSMENT — PAIN SCALES - GENERAL
PAINLEVEL_OUTOF10: 1
PAINLEVEL_OUTOF10: 1

## 2023-08-16 ASSESSMENT — PAIN DESCRIPTION - LOCATION
LOCATION: ABDOMEN
LOCATION: ABDOMEN

## 2023-08-16 ASSESSMENT — PAIN DESCRIPTION - DESCRIPTORS
DESCRIPTORS: CRAMPING
DESCRIPTORS: CRAMPING

## 2023-08-16 ASSESSMENT — PAIN - FUNCTIONAL ASSESSMENT
PAIN_FUNCTIONAL_ASSESSMENT: ACTIVITIES ARE NOT PREVENTED
PAIN_FUNCTIONAL_ASSESSMENT: ACTIVITIES ARE NOT PREVENTED

## 2023-08-16 NOTE — FLOWSHEET NOTE
Patient admitted to room 324 with father of infat at bedside. Patient oriented to room and floor and instructed to call phone number provided or use call light PRN. Admission packet and infant safe sleep reviewed with verbalized understanding. Patient is now resting in bed and infant in NICU.

## 2023-08-16 NOTE — PROGRESS NOTES
RN and provider remained at bedside throughout pushing. EFM continuously assessed.  of viable baby girl at 56. Delayed cord clamping done. APGARS 7/8.

## 2023-08-16 NOTE — PROGRESS NOTES
Pt up and about in room and halls; has just completed shower; per pt IVF heplock fell out while pt in shower. ; will accept offered motrin.

## 2023-08-16 NOTE — PROGRESS NOTES
Pt presents to unit 35.3 weeks gestation with c/o contractions since 0500 this AM. States they have progressively been getting more intense throughout the day.  Denies any vaginal bleeding, LOF, perceives +FM

## 2023-08-16 NOTE — H&P
Obstetric History and Physical       CHIEF COMPLAINT:  contractions    HISTORY OF PRESENT ILLNESS:      Malina Ivan is a 29 y.o., , female who presents at Unknown with an EDC of Estimated Date of Delivery: None noted. .  OB History          1    Para   1    Term   1            AB        Living   1         SAB        IAB        Ectopic        Molar        Multiple   0    Live Births   1            Patient presents with a chief complaint as above and is being admitted for active phase labor  -contractions began this AM and have gotten progressively worse throughout the day/night  -Denies LOF or vaginal bleeding  -Baby is moving well   -Denies HA, SOB, blurry vision, fever,chills, abdominal pain       PRENATAL CARE:    Complicated by: denies    PAST OB HISTORY  OB History          1    Para   1    Term   1            AB        Living   1         SAB        IAB        Ectopic        Molar        Multiple   0    Live Births   1                Past Medical History:        Diagnosis Date    Abnormal Pap smear of cervix     colposcopy     Anemia      Past Surgical History:        Procedure Laterality Date    DILATION AND CURETTAGE OF UTERUS      DILATION AND CURETTAGE OF UTERUS N/A 3/3/2021    SUCTION DILATATION AND CURETTAGE performed by Einar Hamman, MD at [facility] OR    ECHO COMPL W DOP COLOR FLOW  2012         SINUS SURGERY      for epistaxis     Allergies:  Patient has no known allergies.   Social History:    Social History     Socioeconomic History    Marital status:      Spouse name: Not on file    Number of children: Not on file    Years of education: Not on file    Highest education level: Not on file   Occupational History    Not on file   Tobacco Use    Smoking status: Never    Smokeless tobacco: Never   Substance and Sexual Activity    Alcohol use: Not Currently     Comment: ONCE OR TWICE A MONTH    Drug use: No    Sexual activity: Never   Other Topics Concern

## 2023-08-16 NOTE — PROGRESS NOTES
Dr. Vinicius Fernandes updated on pt. SVE Lip/100/-1 with bulging bag. Pt uncomfortable and feeling the urge to push. States he will head in for delivery.

## 2023-08-16 NOTE — LACTATION NOTE
NICU mom set up with an electric breast pump, initiated pumping. Mom is still nursing her 21 month old at home in the evenings. Encouraged mom to pump Q 2-3 hours for 15-20 minutes to establish milk supply. Normal milk production and benefits of colostrum reviewed. Inst on cleaning pump parts, as well as steam bag. Encouraged mom to call with any assistance needed, support provided. Mom has a breast pump for home use.

## 2023-08-16 NOTE — PROCEDURES
Department of Obstetrics and Gynecology  VAGINAL DELIVERY  Procedure Note      Gestational Status:  Term pregnancy and Spontaneous labor     Anesthesia:  None    Delivery Summary:      Delivery Type: AROM at Complete Dilation, followed by normal spontaneous vaginal delivery  prematurely at 35 weeks    Gender: Female infant. Weight:  5 lbs. ,  15 oz. Birth Time: 0031    Apgars: (7 - 8)    Remarks:    No Episiotomy. Left Periurethral Laceration, Superficial, Hemostatic, requiring no repair. Nuchal Cord: was not present    Disposition:  Floor. Specimen:  Placenta was sent to pathology     Estimated blood loss: 350 cc. Condition:  infant stable to special care nursery    Attending Attestation: I performed the procedure.     Suad Calvert MD, Fawn Lesch

## 2023-08-17 LAB
ERYTHROCYTE [DISTWIDTH] IN BLOOD BY AUTOMATED COUNT: 14.5 % (ref 11.5–15)
HCT VFR BLD AUTO: 31.8 % (ref 34–48)
HGB BLD-MCNC: 10.5 G/DL (ref 11.5–15.5)
MCH RBC QN AUTO: 30.9 PG (ref 26–35)
MCHC RBC AUTO-ENTMCNC: 33 G/DL (ref 32–34.5)
MCV RBC AUTO: 93.5 FL (ref 80–99.9)
PLATELET # BLD AUTO: 192 K/UL (ref 130–450)
PMV BLD AUTO: 10.1 FL (ref 7–12)
RBC # BLD AUTO: 3.4 M/UL (ref 3.5–5.5)
WBC OTHER # BLD: 6.9 K/UL (ref 4.5–11.5)

## 2023-08-17 PROCEDURE — 85027 COMPLETE CBC AUTOMATED: CPT

## 2023-08-17 PROCEDURE — 6370000000 HC RX 637 (ALT 250 FOR IP): Performed by: OBSTETRICS & GYNECOLOGY

## 2023-08-17 RX ADMIN — DOCUSATE SODIUM 100 MG: 100 CAPSULE, LIQUID FILLED ORAL at 16:36

## 2023-08-17 RX ADMIN — DOCUSATE SODIUM 100 MG: 100 CAPSULE, LIQUID FILLED ORAL at 23:35

## 2023-08-17 NOTE — PROGRESS NOTES
Pt in NICU throughout the day; states feeling good and wants to spend the day with the baby; declines offered motrin.

## 2023-08-17 NOTE — PLAN OF CARE
Problem: Discharge Planning  Goal: Discharge to home or other facility with appropriate resources  Outcome: Progressing     Problem: Postpartum  Goal: Experiences normal postpartum course  Description:  Postpartum OB-Pregnancy care plan goal which identifies if the mother is experiencing a normal postpartum course  Outcome: Progressing  Goal: Appropriate maternal -  bonding  Description:  Postpartum OB-Pregnancy care plan goal which identifies if the mother and  are bonding appropriately  Outcome: Progressing  Goal: Establishment of infant feeding pattern  Description:  Postpartum OB-Pregnancy care plan goal which identifies if the mother is establishing a feeding pattern with their   Outcome: Progressing  Goal: Incisions, wounds, or drain sites healing without S/S of infection  Outcome: Progressing     Problem: Pain  Goal: Verbalizes/displays adequate comfort level or baseline comfort level  Outcome: Progressing     Problem: Infection - Adult  Goal: Absence of infection at discharge  Outcome: Progressing  Goal: Absence of infection during hospitalization  Outcome: Progressing  Goal: Absence of fever/infection during anticipated neutropenic period  Outcome: Progressing     Problem: Safety - Adult  Goal: Free from fall injury  Outcome: Progressing     Problem: Chronic Conditions and Co-morbidities  Goal: Patient's chronic conditions and co-morbidity symptoms are monitored and maintained or improved  Outcome: Progressing

## 2023-08-18 VITALS
DIASTOLIC BLOOD PRESSURE: 63 MMHG | SYSTOLIC BLOOD PRESSURE: 122 MMHG | OXYGEN SATURATION: 100 % | RESPIRATION RATE: 18 BRPM | TEMPERATURE: 98.7 F | HEART RATE: 89 BPM

## 2023-08-18 LAB
CHLAMYDIA DNA UR QL NAA+PROBE: NEGATIVE
N GONORRHOEA DNA UR QL NAA+PROBE: NEGATIVE
SPECIMEN DESCRIPTION: NORMAL
SURGICAL PATHOLOGY REPORT: NORMAL

## 2023-08-18 PROCEDURE — 1220000000 HC SEMI PRIVATE OB R&B

## 2023-08-18 PROCEDURE — 6370000000 HC RX 637 (ALT 250 FOR IP): Performed by: OBSTETRICS & GYNECOLOGY

## 2023-08-18 RX ORDER — IBUPROFEN 600 MG/1
600 TABLET ORAL EVERY 6 HOURS PRN
Qty: 60 TABLET | Refills: 1 | Status: SHIPPED | OUTPATIENT
Start: 2023-08-18

## 2023-08-18 RX ADMIN — DOCUSATE SODIUM 100 MG: 100 CAPSULE, LIQUID FILLED ORAL at 09:09

## 2023-08-18 ASSESSMENT — PAIN DESCRIPTION - RADICULAR PAIN: RADICULAR_PAIN: ABSENT

## 2023-08-18 NOTE — DISCHARGE SUMMARY
Department of Obstetrics & Gynecology  OBSTETRICAL  VAGINAL DELIVERY  DISCHARGE SUMMARY    [address] [facility] Elizabeth Almanza is a 29y.o. year old  female. JOLANTA: Estimated Date of Delivery: 23     Gestational Age: 35w3d    Admitted on: 8/15/2023     Admitting Diagnosis: Active  labor, fetus 1 [O60.10X1]    PAST OB HISTORY  OB History          5    Para   2    Term   1       1    AB   3    Living   2         SAB   3    IAB        Ectopic        Molar        Multiple   0    Live Births   2                Antepartum complications: None     Date of Delivery:   Information for the patient's :  Alissa Parekh [[id number]]   2023      Delivery Type: Information for the patient's :  Alex Landaverde [[id number]]   Delivery Method: Vaginal, Spontaneous     Anesthesia: Local     Information:       GENDER:   Information for the patient's :  Alex Landaverde [[id number]]   female    BIRTH WEIGHT:   Information for the patient's :  Alex Landaverde [[id number]]   Birth Weight: 5 lb 15.9 oz (2.72 kg)    APGARS:   Information for the patient's :  Alex Landaverde [[id number]]   APGAR One: 7    Information for the patient's :  Alex Landaverde [[id number]]   APGAR Five: 8        Intrapartum complications: None    Delivered By: Shireen Foster MD    Placenta: spontaneous    [address] Course/Complications: Uneventful     Discharge Date:  23 to Home in Good Condition.     Discharge Medications:      Medication List        CONTINUE taking these medications      acetaminophen 500 MG tablet  Commonly known as: TYLENOL  Take 1 tablet by mouth 4 times daily as needed for Pain     docusate sodium 100 MG capsule  Commonly known as: COLACE  Take 1 capsule by mouth 2 times daily     ferrous sulfate 325 (65 Fe) MG tablet  Commonly known as: IRON 325     ibuprofen 600 MG tablet  Commonly known as: ADVIL;MOTRIN  Take 1 tablet by mouth every 6 hours as needed for Pain     PRENATAL 1 PLUS 1 PO               Where to Get Your Medications        You can get these medications from any pharmacy    Bring a paper prescription for each of these medications  ibuprofen 600 MG tablet         Follow-up Plan:  Appointment with Leeroy Sands MD, MD in 6 weeks.     Leeroy Sands MD, [facility]  Obstetrics & Gynecology

## 2023-08-18 NOTE — DISCHARGE INSTRUCTIONS
Follow up with your OB doctor in  6 weeks for a vaginal delivery unless otherwise instructed. Call office for appointment. .  For breastfeeding support, you can contact our lactation specialists at [phone] or   [phone]. DIET  Eat a well balanced diet focusing on foods high in fiber and protein  Drink plenty of fluids especially water. To avoid constipation you may take a mild stool softener as recommended by your doctor or midwife. ACTIVITY  Gradually increase your activity. Resume exercise regimen only after advised by your doctor or midwife. Avoid lifting anything heavier than your baby or a gallon of milk until OK'd by your physician or midlife. Avoid driving until your doctor or midwife has given their approval.  Gretel Mcintosh slowly from a lying to sitting and then a standing position. Climb stairs one at a time. Use caution when carrying your baby up and down the stairs. No sexual activity for 6 weeks or until advised by your doctor - Nothing in vagina: intercourse, tampons, or douching. Be prepared to discuss family planning at your follow-up OB visit. You may feel tired or have a lack of energy. You may continue your prenatal vitamin to replenish nutrients post delivery. Nap when infant naps to catch up on sleep. May return to work or school in 6 weeks or as directed by physician or midlife. Take pain medication as prescribed whenever you need them  Take care of yourself by sleeping/resting as much as possible. Let someone else care for you, your infant and housework as much as possible for a while. EMOTIONS  You may feed callahan, sad, teary, & overwhelmed. If infant will not stop crying, contact another adult for help or place infant in their crib on their back and take a break. NEVER shake your infant.     Call your doctor if you have unrelieved feelings of: inability to cope, anxiety, lack of interest in the

## 2023-08-18 NOTE — PLAN OF CARE
Problem: Discharge Planning  Goal: Discharge to home or other facility with appropriate resources  Outcome: Completed     Problem: Postpartum  Goal: Experiences normal postpartum course  Description:  Postpartum OB-Pregnancy care plan goal which identifies if the mother is experiencing a normal postpartum course  Outcome: Completed  Goal: Appropriate maternal -  bonding  Description:  Postpartum OB-Pregnancy care plan goal which identifies if the mother and  are bonding appropriately  Outcome: Completed  Goal: Establishment of infant feeding pattern  Description:  Postpartum OB-Pregnancy care plan goal which identifies if the mother is establishing a feeding pattern with their   Outcome: Completed  Goal: Incisions, wounds, or drain sites healing without S/S of infection  Outcome: Completed     Problem: Pain  Goal: Verbalizes/displays adequate comfort level or baseline comfort level  Outcome: Completed     Problem: Infection - Adult  Goal: Absence of infection at discharge  Outcome: Completed  Goal: Absence of infection during hospitalization  Outcome: Completed  Goal: Absence of fever/infection during anticipated neutropenic period  Outcome: Completed     Problem: Safety - Adult  Goal: Free from fall injury  Outcome: Completed     Problem: Chronic Conditions and Co-morbidities  Goal: Patient's chronic conditions and co-morbidity symptoms are monitored and maintained or improved  Outcome: Completed

## 2023-08-18 NOTE — LACTATION NOTE
Mom continues to pump colostrum for her premature baby in the NICU. Discussed tandem breastfeeding and gave mom information from the [facility]. Also gave mom more syringes to collect colostrum.

## 2023-08-19 NOTE — PROGRESS NOTES
Discharge teaching and instructions gone over and signed, all questions answered. Discharged from room 324 ambulatory with RN and .

## 2024-05-04 ENCOUNTER — HOSPITAL ENCOUNTER (EMERGENCY)
Age: 34
Discharge: HOME | End: 2024-05-04
Attending: EMERGENCY MEDICINE
Payer: COMMERCIAL

## 2024-05-04 VITALS
HEIGHT: 71 IN | SYSTOLIC BLOOD PRESSURE: 132 MMHG | RESPIRATION RATE: 16 BRPM | BODY MASS INDEX: 32.2 KG/M2 | WEIGHT: 230 LBS | DIASTOLIC BLOOD PRESSURE: 89 MMHG | HEART RATE: 65 BPM | TEMPERATURE: 98.4 F | OXYGEN SATURATION: 100 %

## 2024-05-04 DIAGNOSIS — K04.7 DENTAL INFECTION: ICD-10-CM

## 2024-05-04 DIAGNOSIS — K02.9 PAIN DUE TO DENTAL CARIES: ICD-10-CM

## 2024-05-04 DIAGNOSIS — K08.89 PAIN, DENTAL: Primary | ICD-10-CM

## 2024-05-04 DIAGNOSIS — S02.5XXA CLOSED FRACTURE OF TOOTH, INITIAL ENCOUNTER: ICD-10-CM

## 2024-05-04 PROCEDURE — 99283 EMERGENCY DEPT VISIT LOW MDM: CPT

## 2024-05-04 PROCEDURE — 6370000000 HC RX 637 (ALT 250 FOR IP): Performed by: EMERGENCY MEDICINE

## 2024-05-04 RX ORDER — AMOXICILLIN 500 MG/1
500 CAPSULE ORAL 3 TIMES DAILY
Qty: 21 CAPSULE | Refills: 0 | Status: SHIPPED | OUTPATIENT
Start: 2024-05-04 | End: 2024-05-11

## 2024-05-04 RX ORDER — AMOXICILLIN 250 MG/1
500 CAPSULE ORAL ONCE
Status: COMPLETED | OUTPATIENT
Start: 2024-05-04 | End: 2024-05-04

## 2024-05-04 RX ORDER — TRAMADOL HYDROCHLORIDE 50 MG/1
50 TABLET ORAL ONCE
Status: DISCONTINUED | OUTPATIENT
Start: 2024-05-04 | End: 2024-05-04

## 2024-05-04 RX ADMIN — AMOXICILLIN 500 MG: 250 CAPSULE ORAL at 02:05

## 2024-05-04 ASSESSMENT — PAIN DESCRIPTION - FREQUENCY
FREQUENCY: CONTINUOUS
FREQUENCY: CONTINUOUS

## 2024-05-04 ASSESSMENT — PAIN DESCRIPTION - ONSET
ONSET: ON-GOING
ONSET: ON-GOING

## 2024-05-04 ASSESSMENT — PAIN DESCRIPTION - PAIN TYPE
TYPE: ACUTE PAIN
TYPE: ACUTE PAIN

## 2024-05-04 ASSESSMENT — PAIN DESCRIPTION - DESCRIPTORS
DESCRIPTORS: ACHING;SORE;THROBBING
DESCRIPTORS: ACHING;SORE;THROBBING

## 2024-05-04 ASSESSMENT — PAIN DESCRIPTION - LOCATION
LOCATION: TEETH
LOCATION: TEETH

## 2024-05-04 ASSESSMENT — PAIN - FUNCTIONAL ASSESSMENT
PAIN_FUNCTIONAL_ASSESSMENT: 0-10
PAIN_FUNCTIONAL_ASSESSMENT: ACTIVITIES ARE NOT PREVENTED
PAIN_FUNCTIONAL_ASSESSMENT: 0-10
PAIN_FUNCTIONAL_ASSESSMENT: ACTIVITIES ARE NOT PREVENTED

## 2024-05-04 ASSESSMENT — PAIN SCALES - GENERAL: PAINLEVEL_OUTOF10: 7

## 2024-05-04 ASSESSMENT — PAIN DESCRIPTION - ORIENTATION
ORIENTATION: RIGHT;LOWER
ORIENTATION: RIGHT;LOWER

## 2024-05-04 NOTE — ED PROVIDER NOTES
HPI:  5/4/24,   Time: 2:00 AM EDT         Malina Calvert is a 33 y.o. female presenting to the ED for right lower molar dental pain, beginning several months ago.  The complaint has been persistent, mild in severity, and worsened by nothing.  Patient is not a diabetic there is no periapical abscess is a fractured tooth and possibly caries or caries in the same premolar area she is breast-feeding so I could not give her narcotics I did put her on amoxicillin told her to follow-up with the dental clinic on Monday and return if increased facial swelling or neck swelling which she has none at this time she has no chest pain or shortness of breath I told her that ibuprofen is okay with breast-feeding    ROS:   Pertinent positives and negatives are stated within HPI, all other systems reviewed and are negative.  --------------------------------------------- PAST HISTORY ---------------------------------------------  Past Medical History:  has a past medical history of Abnormal Pap smear of cervix and Anemia.    Past Surgical History:  has a past surgical history that includes ECHO Compl W Dop Color Flow (5/2/2012); Dilation and curettage of uterus; sinus surgery; and Dilation and curettage of uterus (N/A, 3/3/2021).    Social History:  reports that she has never smoked. She has never used smokeless tobacco. She reports that she does not currently use alcohol. She reports that she does not use drugs.    Family History: family history includes Diabetes in her maternal grandmother; Heart Disease in her maternal grandmother; High Blood Pressure in her maternal grandmother.     The patient’s home medications have been reviewed.    Allergies: Patient has no known allergies.    -------------------------------------------------- RESULTS -------------------------------------------------  All laboratory and radiology results have been personally reviewed by myself   LABS:  No results found for this visit on

## 2025-04-09 ENCOUNTER — ANESTHESIA EVENT (OUTPATIENT)
Dept: OPERATING ROOM | Age: 35
End: 2025-04-09
Payer: COMMERCIAL

## 2025-04-09 RX ORDER — CALCIUM CARBONATE 500(1250)
500 TABLET ORAL DAILY
COMMUNITY

## 2025-04-09 ASSESSMENT — LIFESTYLE VARIABLES: SMOKING_STATUS: 0
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
## 2025-04-09 NOTE — PROGRESS NOTES
Spoke with Dr Gonzalez's office, patient will need FHT before and after procedure.  Notified labor and delivery.  They will need called when patient arrives tomorrow (ext [phone]).

## 2025-04-09 NOTE — PROGRESS NOTES
[facility] PRE-ADMISSION TESTING INSTRUCTIONS: [phone]    The Preadmission Testing patient is instructed accordingly using the following criteria (check applicable):    ARRIVAL INSTRUCTIONS:     Arrival Time: 1100    [x] Parking the day of Surgery is located in the Main Entrance lot.  Upon entering through the main entrance (Entrance A) make an immediate right to the surgery reception desk    [x] Bring photo ID and insurance card    [x] Bring in a copy of Living will or Durable Power of  papers.    [x] Please be sure to arrange for a responsible adult to provide transportation to and from the hospital    [x] Please arrange for a responsible adult to be with you for the 24 hour period post procedure, due to having anesthesia    [x] Please notify surgeon if you develop any illness between now and time of surgery (cold, cough, sore throat, fever, nausea, vomiting) or any signs of infections  including skin, wounds, and dental.    [x] If you awake am of surgery not feeling well or have temperature >100 please call [phone].    GENERAL INSTRUCTIONS:    [x] No solid foods after midnight. You may only have up to 8oz of water from midnight until 4 hours prior to surgery. No gum, no candy, no mints.        [x] You may brush your teeth    [x] Take medications as instructed: None    [x] Stop herbal supplements and vitamins 5 days prior to procedure    [x] Shower or bath with soap, lather and rinse well, AM of Surgery, no lotion, powders or creams to surgical site    [x] Please do not wear any nail polish, make up, hair products, body spray, aftershave, cologne or perfume on the day of surgery    [x] Jewelry, body piercings, eyeglasses, contact lenses and dentures are not permitted into surgery (bring cases)    [x] No tobacco products within 24 hours of surgery     [x] No alcohol or illegal drug use, marijuana included, within 24 hours of surgery.    [x] You can expect a call the

## 2025-04-10 ENCOUNTER — HOSPITAL ENCOUNTER (OUTPATIENT)
Age: 35
Setting detail: OUTPATIENT SURGERY
Discharge: HOME | End: 2025-04-10
Attending: OBSTETRICS & GYNECOLOGY | Admitting: OBSTETRICS & GYNECOLOGY
Payer: COMMERCIAL

## 2025-04-10 ENCOUNTER — ANESTHESIA (OUTPATIENT)
Dept: OPERATING ROOM | Age: 35
End: 2025-04-10
Payer: COMMERCIAL

## 2025-04-10 VITALS
BODY MASS INDEX: 30.21 KG/M2 | OXYGEN SATURATION: 100 % | HEART RATE: 75 BPM | WEIGHT: 211 LBS | DIASTOLIC BLOOD PRESSURE: 49 MMHG | RESPIRATION RATE: 15 BRPM | TEMPERATURE: 97 F | SYSTOLIC BLOOD PRESSURE: 104 MMHG | HEIGHT: 70 IN

## 2025-04-10 DIAGNOSIS — N87.1 MODERATE DYSPLASIA OF CERVIX: ICD-10-CM

## 2025-04-10 PROCEDURE — 3600000013 HC SURGERY LEVEL 3 ADDTL 15MIN: Performed by: OBSTETRICS & GYNECOLOGY

## 2025-04-10 PROCEDURE — 2709999900 HC NON-CHARGEABLE SUPPLY: Performed by: OBSTETRICS & GYNECOLOGY

## 2025-04-10 PROCEDURE — 6370000000 HC RX 637 (ALT 250 FOR IP): Performed by: ANESTHESIOLOGY

## 2025-04-10 PROCEDURE — 6360000002 HC RX W HCPCS: Performed by: OBSTETRICS & GYNECOLOGY

## 2025-04-10 PROCEDURE — 3600000003 HC SURGERY LEVEL 3 BASE: Performed by: OBSTETRICS & GYNECOLOGY

## 2025-04-10 PROCEDURE — 7100000001 HC PACU RECOVERY - ADDTL 15 MIN: Performed by: OBSTETRICS & GYNECOLOGY

## 2025-04-10 PROCEDURE — 7100000010 HC PHASE II RECOVERY - FIRST 15 MIN: Performed by: OBSTETRICS & GYNECOLOGY

## 2025-04-10 PROCEDURE — 88307 TISSUE EXAM BY PATHOLOGIST: CPT

## 2025-04-10 PROCEDURE — 2500000003 HC RX 250 WO HCPCS: Performed by: OBSTETRICS & GYNECOLOGY

## 2025-04-10 PROCEDURE — 2580000003 HC RX 258: Performed by: NURSE ANESTHETIST, CERTIFIED REGISTERED

## 2025-04-10 PROCEDURE — 88342 IMHCHEM/IMCYTCHM 1ST ANTB: CPT

## 2025-04-10 PROCEDURE — 3700000001 HC ADD 15 MINUTES (ANESTHESIA): Performed by: OBSTETRICS & GYNECOLOGY

## 2025-04-10 PROCEDURE — 7100000011 HC PHASE II RECOVERY - ADDTL 15 MIN: Performed by: OBSTETRICS & GYNECOLOGY

## 2025-04-10 PROCEDURE — 3700000000 HC ANESTHESIA ATTENDED CARE: Performed by: OBSTETRICS & GYNECOLOGY

## 2025-04-10 PROCEDURE — 6360000002 HC RX W HCPCS: Performed by: NURSE ANESTHETIST, CERTIFIED REGISTERED

## 2025-04-10 PROCEDURE — 7100000000 HC PACU RECOVERY - FIRST 15 MIN: Performed by: OBSTETRICS & GYNECOLOGY

## 2025-04-10 PROCEDURE — 6360000002 HC RX W HCPCS: Performed by: ANESTHESIOLOGY

## 2025-04-10 PROCEDURE — 2580000003 HC RX 258: Performed by: ANESTHESIOLOGY

## 2025-04-10 RX ORDER — SODIUM CHLORIDE 0.9 % (FLUSH) 0.9 %
5-40 SYRINGE (ML) INJECTION EVERY 12 HOURS SCHEDULED
Status: DISCONTINUED | OUTPATIENT
Start: 2025-04-10 | End: 2025-04-10 | Stop reason: HOSPADM

## 2025-04-10 RX ORDER — HYDRALAZINE HYDROCHLORIDE 20 MG/ML
5 INJECTION INTRAMUSCULAR; INTRAVENOUS
Status: DISCONTINUED | OUTPATIENT
Start: 2025-04-10 | End: 2025-04-10 | Stop reason: HOSPADM

## 2025-04-10 RX ORDER — METOCLOPRAMIDE HYDROCHLORIDE 5 MG/ML
10 INJECTION INTRAMUSCULAR; INTRAVENOUS ONCE
Status: COMPLETED | OUTPATIENT
Start: 2025-04-10 | End: 2025-04-10

## 2025-04-10 RX ORDER — DIPHENHYDRAMINE HYDROCHLORIDE 50 MG/ML
12.5 INJECTION, SOLUTION INTRAMUSCULAR; INTRAVENOUS
Status: DISCONTINUED | OUTPATIENT
Start: 2025-04-10 | End: 2025-04-10 | Stop reason: HOSPADM

## 2025-04-10 RX ORDER — LABETALOL HYDROCHLORIDE 5 MG/ML
5 INJECTION, SOLUTION INTRAVENOUS
Status: DISCONTINUED | OUTPATIENT
Start: 2025-04-10 | End: 2025-04-10 | Stop reason: HOSPADM

## 2025-04-10 RX ORDER — BUPIVACAINE HYDROCHLORIDE 7.5 MG/ML
INJECTION, SOLUTION INTRASPINAL
Status: DISCONTINUED | OUTPATIENT
Start: 2025-04-10 | End: 2025-04-10 | Stop reason: SDUPTHER

## 2025-04-10 RX ORDER — OXYCODONE HYDROCHLORIDE 5 MG/1
5 TABLET ORAL EVERY 6 HOURS PRN
Qty: 12 TABLET | Refills: 0 | Status: SHIPPED | OUTPATIENT
Start: 2025-04-10 | End: 2025-04-13

## 2025-04-10 RX ORDER — SODIUM CHLORIDE 9 MG/ML
INJECTION, SOLUTION INTRAVENOUS PRN
Status: DISCONTINUED | OUTPATIENT
Start: 2025-04-10 | End: 2025-04-10 | Stop reason: HOSPADM

## 2025-04-10 RX ORDER — SODIUM CHLORIDE, SODIUM LACTATE, POTASSIUM CHLORIDE, CALCIUM CHLORIDE 600; 310; 30; 20 MG/100ML; MG/100ML; MG/100ML; MG/100ML
INJECTION, SOLUTION INTRAVENOUS CONTINUOUS
Status: DISCONTINUED | OUTPATIENT
Start: 2025-04-10 | End: 2025-04-10 | Stop reason: HOSPADM

## 2025-04-10 RX ORDER — SODIUM CHLORIDE 9 MG/ML
INJECTION, SOLUTION INTRAVENOUS
Status: DISCONTINUED | OUTPATIENT
Start: 2025-04-10 | End: 2025-04-10 | Stop reason: SDUPTHER

## 2025-04-10 RX ORDER — NALOXONE HYDROCHLORIDE 0.4 MG/ML
INJECTION, SOLUTION INTRAMUSCULAR; INTRAVENOUS; SUBCUTANEOUS PRN
Status: DISCONTINUED | OUTPATIENT
Start: 2025-04-10 | End: 2025-04-10 | Stop reason: HOSPADM

## 2025-04-10 RX ORDER — FENTANYL CITRATE 50 UG/ML
25 INJECTION, SOLUTION INTRAMUSCULAR; INTRAVENOUS EVERY 5 MIN PRN
Status: DISCONTINUED | OUTPATIENT
Start: 2025-04-10 | End: 2025-04-10 | Stop reason: HOSPADM

## 2025-04-10 RX ORDER — SODIUM CHLORIDE 0.9 % (FLUSH) 0.9 %
5-40 SYRINGE (ML) INJECTION PRN
Status: DISCONTINUED | OUTPATIENT
Start: 2025-04-10 | End: 2025-04-10 | Stop reason: HOSPADM

## 2025-04-10 RX ORDER — PROCHLORPERAZINE EDISYLATE 5 MG/ML
5 INJECTION INTRAMUSCULAR; INTRAVENOUS
Status: DISCONTINUED | OUTPATIENT
Start: 2025-04-10 | End: 2025-04-10 | Stop reason: HOSPADM

## 2025-04-10 RX ORDER — PHENYLEPHRINE HCL IN 0.9% NACL 1 MG/10 ML
SYRINGE (ML) INTRAVENOUS
Status: DISCONTINUED | OUTPATIENT
Start: 2025-04-10 | End: 2025-04-10 | Stop reason: SDUPTHER

## 2025-04-10 RX ORDER — ACETAMINOPHEN 500 MG
1000 TABLET ORAL ONCE
Status: COMPLETED | OUTPATIENT
Start: 2025-04-10 | End: 2025-04-10

## 2025-04-10 RX ADMIN — BUPIVACAINE HYDROCHLORIDE IN DEXTROSE 1.2 ML: 7.5 INJECTION, SOLUTION SUBARACHNOID at 15:36

## 2025-04-10 RX ADMIN — WATER 2000 MG: 1 INJECTION INTRAMUSCULAR; INTRAVENOUS; SUBCUTANEOUS at 15:26

## 2025-04-10 RX ADMIN — SODIUM CHLORIDE: 9 INJECTION, SOLUTION INTRAVENOUS at 15:26

## 2025-04-10 RX ADMIN — METOCLOPRAMIDE 10 MG: 5 INJECTION, SOLUTION INTRAMUSCULAR; INTRAVENOUS at 11:39

## 2025-04-10 RX ADMIN — Medication 200 MCG: at 15:42

## 2025-04-10 RX ADMIN — ACETAMINOPHEN 1000 MG: 500 TABLET ORAL at 11:38

## 2025-04-10 RX ADMIN — FAMOTIDINE 20 MG: 10 INJECTION, SOLUTION INTRAVENOUS at 11:39

## 2025-04-10 ASSESSMENT — PAIN - FUNCTIONAL ASSESSMENT: PAIN_FUNCTIONAL_ASSESSMENT: 0-10

## 2025-04-10 NOTE — ANESTHESIA POSTPROCEDURE EVALUATION
Department of Anesthesiology  Postprocedure Note    Patient: Malina Calvert  MRN: [id number]  YOB: 1990  Date of evaluation: 4/10/2025    Procedure Summary       Date: 04/10/25 Room / Location: [address]    Anesthesia Start: 1526 Anesthesia Stop:     Procedure: LEEP (PT PREGNANT) (Vagina ) Diagnosis:       Moderate dysplasia of cervix      (Moderate dysplasia of cervix [N87.1])    Surgeons: Julio Gonzalez MD Responsible Provider: Jose R Rosales DO    Anesthesia Type: Spinal ASA Status: 2            Anesthesia Type: Spinal    Pam Phase I: Pam Score: 10    Pam Phase II:      Anesthesia Post Evaluation    Patient location during evaluation: bedside  Patient participation: complete - patient participated  Level of consciousness: awake  Pain score: 0  Airway patency: patent  Nausea & Vomiting: no nausea and no vomiting  Cardiovascular status: blood pressure returned to baseline and hemodynamically stable  Respiratory status: acceptable  Hydration status: stable  Comments: Patient seen and examined.  Progressing as expected.  No anesthetic related questions or concerns at this time.  Pain management: adequate and satisfactory to patient      No notable events documented.

## 2025-04-10 NOTE — OP NOTE
[place] [address]                            OPERATIVE REPORT      PATIENT NAME: JANETTE LEIJA            : 1990  MED REC NO: [id number]                        ROOM: [facility]  ACCOUNT NO: [id number]                       ADMIT DATE: 04/10/2025  PROVIDER: Julio Gonzalez MD      DATE OF PROCEDURE:  04/10/2025    SURGEON:  Julio Gonzalez MD    PREOPERATIVE DIAGNOSES:  Cervical intraepithelial neoplasia 2, 14 weeks and 6 days' gestation.    POSTOPERATIVE DIAGNOSES:  Cervical intraepithelial neoplasia 2, 14 weeks and 6 days' gestation.    PROCEDURE:  Loop electrosurgical excision procedure of cervix.    ANESTHESIA:  Spinal, saddle block.    COMPLICATIONS:  None.    ESTIMATED BLOOD LOSS:  20 mL.    FINDINGS:  Grossly normal cervix.  Uterus 14- to 15-week size.    DESCRIPTION OF PROCEDURE:  The patient was taken to the operative room, placed in the dorsal supine position, general anesthesia was administered.  The patient was placed in the dorsal supine position.  She was asked to sit.  Spinal anesthetic was administered.  The patient remained sitting for approximately 2 minutes to affect the saddle block.  She was then reclined back to the supine position and in the dorsal lithotomy position, after which the patient was prepped and draped.  A weighted speculum was inserted into the posterior vaginal vault.  A right-angle retractor retracted the anterior abdominal wall and a stay suture was placed at the 4 and 8 o'clock positions on the cervix and tied.  This allowed some mild traction to the cervix and a cautery scissor retractor was then placed, retracting the lateral vaginal walls with a right angle retractor retracting the anterior wall and a LEEP procedure with the cautery wand was undertaken by first taking a superficial shallow pass off the upper hemisphere of the cervix followed by the lower hemisphere of the

## 2025-04-10 NOTE — BRIEF OP NOTE
Brief Op Note:    PreOp Dx: 14w6d Gestation, MABLE 2.    PostOp Dx: Same.    Procedure; LEEP of Cervix.    Anesthesia, Spinal, Saddle Block.    Comps: None.    EBL: 20 mL.    Findings Grossly Normal Cervix, Uterus 14-15 wks Size.     RHYS Gonzalez MD

## 2025-04-10 NOTE — PROGRESS NOTES
Pt able to stand safely on own with staff at bedside. Pt has feeling back up to her buttocks. Pt called sister to come pick her up.

## 2025-04-10 NOTE — H&P
Department of Obstetrics & Gynecology  GYNECOLOGIC ADMISSION  HISTORY & PHYSICAL      CHIEF COMPLAINT:  Moderate Dysplasia of Cervix.    HISTORY OF PRESENT ILLNESS:    The patient is a 34 y.o. female, , who is 14w6d gestation, with CIN2 on multiple Cervical biopsies at colposcopy in office.  She presents for a LEEP Conization of the cervix.    PAST OB HISTORY  OB History          6    Para   2    Term   1       1    AB   3    Living   2         SAB   3    IAB        Ectopic        Molar        Multiple   0    Live Births   2                PAST MEDICAL HISTORY:        Diagnosis Date    Abnormal Pap smear of cervix     colposcopy     Anemia      PAST SURGICAL HISTORY:        Procedure Laterality Date    DILATION AND CURETTAGE OF UTERUS      DILATION AND CURETTAGE OF UTERUS N/A 3/3/2021    SUCTION DILATATION AND CURETTAGE performed by Julio Gonzalez MD at [facility] OR    ECHO [facility] W DOP COLOR FLOW  2012         SINUS SURGERY      for epistaxis     ALLERGIES:  Patient has no known allergies.  SOCIAL HISTORY:    Social History     Socioeconomic History    Marital status:      Spouse name: Not on file    Number of children: Not on file    Years of education: Not on file    Highest education level: Not on file   Occupational History    Not on file   Tobacco Use    Smoking status: Never    Smokeless tobacco: Never   Vaping Use    Vaping status: Never Used   Substance and Sexual Activity    Alcohol use: Not Currently    Drug use: No    Sexual activity: Not on file   Other Topics Concern    Not on file   Social History Narrative    ** Merged History Encounter **          Social Drivers of Health     Financial Resource Strain: Not on file   Food Insecurity: Not on file   Transportation Needs: Not on file   Physical Activity: Not on file   Stress: Not on file   Social Connections: Not on file   Intimate Partner Violence: Not on file   Housing Stability: Not on file     FAMILY HISTORY:        Problem Relation Age of Onset    Heart Disease Maternal Grandmother     High Blood Pressure Maternal Grandmother     Diabetes Maternal Grandmother      MEDICATIONS PRIOR TO ADMISSION:  Medications Prior to Admission: Prenatal MV-Min-Fe Fum-FA-DHA (PRENATAL+DHA PO), Take by mouth  calcium carbonate (OSCAL) 500 MG TABS tablet, Take 1 tablet by mouth daily    REVIEW OF SYSTEMS:    CONSTITUTIONAL:  negative  RESPIRATORY:  negative  CARDIOVASCULAR:  negative  GASTROINTESTINAL:  negative  ALLERGIC/IMMUNOLOGIC:  negative  NEUROLOGICAL:  negative  BEHAVIOR/PSYCH:  negative    ROS: Negative except for HPI.     PHYSICAL EXAM: General Appearance:  awake, alert, oriented, in no acute distress  Eyes:  No gross abnormalities., PERRL, and EOMI  Lungs:  No Increased Work of Breathing  Heart:  Heart regular rate and rhythm  Abdomen:  Soft, non-tender, normal bowel sounds.  No bruits, organomegaly or masses.  Pelvic:  Uterus about 15 wks gestation.     ASSESSMENT: 14w6d, Moderate Dysplasia of Cervix.    PLAN: LEEP of Cervix.    Julio Gonzalez MD, FACOG

## 2025-04-10 NOTE — ANESTHESIA PROCEDURE NOTES
Spinal Block    Patient location during procedure: OR  End time: 4/10/2025 3:36 PM  Reason for block: primary anesthetic and at surgeon's request  Staffing  Performed by: Bro Vick APRN - CRNA  Authorized by: Jose R Rosales,     Spinal Block  Patient position: sitting  Prep: Betadine and site prepped and draped  Patient monitoring: cardiac monitor, continuous pulse ox, continuous capnometry and frequent blood pressure checks  Approach: midline  Location: L3/L4  Provider prep: mask, sterile gloves and sterile gown  Local infiltration: lidocaine  Needle  Needle type: Pencan   Needle gauge: 25 G  Needle length: 3.5 in  Assessment  Sensory level: T6  Swirl obtained: Yes  CSF: clear  Attempts: 1  Hemodynamics: stable  Preanesthetic Checklist  Completed: patient identified, IV checked, site marked, risks and benefits discussed, surgical/procedural consents, equipment checked, pre-op evaluation, timeout performed, anesthesia consent given, oxygen available, monitors applied/VS acknowledged and fire risk safety assessment completed and verbalized

## 2025-04-21 LAB — SURGICAL PATHOLOGY REPORT: NORMAL

## 2025-07-12 ENCOUNTER — ANCILLARY PROCEDURE (OUTPATIENT)
Age: 35
DRG: 566 | End: 2025-07-12
Payer: COMMERCIAL

## 2025-07-12 ENCOUNTER — HOSPITAL ENCOUNTER (INPATIENT)
Age: 35
LOS: 8 days | Discharge: HOME | DRG: 566 | End: 2025-07-25
Attending: OBSTETRICS & GYNECOLOGY | Admitting: OBSTETRICS & GYNECOLOGY
Payer: COMMERCIAL

## 2025-07-12 DIAGNOSIS — R76.0 ANTICARDIOLIPIN ANTIBODY POSITIVE: ICD-10-CM

## 2025-07-12 DIAGNOSIS — D64.9 NORMOCHROMIC NORMOCYTIC ANEMIA: ICD-10-CM

## 2025-07-12 DIAGNOSIS — D64.9 ANEMIA, UNSPECIFIED TYPE: ICD-10-CM

## 2025-07-12 DIAGNOSIS — Z98.890 HISTORY OF CERVICAL LEEP BIOPSY AFFECTING CARE OF MOTHER, ANTEPARTUM: ICD-10-CM

## 2025-07-12 DIAGNOSIS — E61.1 IRON DEFICIENCY: ICD-10-CM

## 2025-07-12 DIAGNOSIS — O46.90 VAGINAL BLEEDING DURING PREGNANCY, ANTEPARTUM: Primary | ICD-10-CM

## 2025-07-12 DIAGNOSIS — Z15.89 PAI-1 4G/5G GENOTYPE: ICD-10-CM

## 2025-07-12 DIAGNOSIS — Z87.51 HISTORY OF PRETERM DELIVERY: ICD-10-CM

## 2025-07-12 DIAGNOSIS — Z86.39 HISTORY OF VITAMIN D DEFICIENCY: ICD-10-CM

## 2025-07-12 DIAGNOSIS — O26.23 HIGH RISK PREGNANCY DUE TO RECURRENT PREGNANCY LOSS IN THIRD TRIMESTER: ICD-10-CM

## 2025-07-12 DIAGNOSIS — O26.873 SHORT CERVICAL LENGTH DURING PREGNANCY IN THIRD TRIMESTER: ICD-10-CM

## 2025-07-12 DIAGNOSIS — R82.90 ABNORMAL URINALYSIS: ICD-10-CM

## 2025-07-12 DIAGNOSIS — O34.40 HISTORY OF CERVICAL LEEP BIOPSY AFFECTING CARE OF MOTHER, ANTEPARTUM: ICD-10-CM

## 2025-07-12 DIAGNOSIS — O26.879 SHORT CERVIX AFFECTING PREGNANCY: ICD-10-CM

## 2025-07-12 PROBLEM — O26.893 VAGINAL DISCHARGE DURING PREGNANCY IN THIRD TRIMESTER: Status: ACTIVE | Noted: 2025-07-12

## 2025-07-12 PROBLEM — N89.8 VAGINAL DISCHARGE DURING PREGNANCY IN THIRD TRIMESTER: Status: ACTIVE | Noted: 2025-07-12

## 2025-07-12 PROBLEM — Z81.8 FAMILY HISTORY OF AUTISM: Status: ACTIVE | Noted: 2025-07-12

## 2025-07-12 LAB
ALBUMIN SERPL-MCNC: 3.4 G/DL (ref 3.5–5.2)
ALP SERPL-CCNC: 74 U/L (ref 35–104)
ALT SERPL-CCNC: 8 U/L (ref 0–35)
ANION GAP SERPL CALCULATED.3IONS-SCNC: 9 MMOL/L (ref 7–16)
AST SERPL-CCNC: 17 U/L (ref 0–35)
BACTERIA URNS QL MICRO: ABNORMAL
BILIRUB SERPL-MCNC: <0.2 MG/DL (ref 0–1.2)
BILIRUB UR QL STRIP: NEGATIVE
BUN SERPL-MCNC: 5 MG/DL (ref 6–20)
CALCIUM SERPL-MCNC: 8.6 MG/DL (ref 8.6–10)
CHLORIDE SERPL-SCNC: 106 MMOL/L (ref 98–107)
CLARITY UR: CLEAR
CLUE CELLS VAG QL WET PREP: NORMAL
CO2 SERPL-SCNC: 22 MMOL/L (ref 22–29)
COLOR UR: YELLOW
CREAT SERPL-MCNC: 0.7 MG/DL (ref 0.5–1)
EPI CELLS #/AREA URNS HPF: ABNORMAL /HPF
ERYTHROCYTE [DISTWIDTH] IN BLOOD BY AUTOMATED COUNT: 16.9 % (ref 11.5–15)
GFR, ESTIMATED: >90 ML/MIN/1.73M2
GLUCOSE SERPL-MCNC: 92 MG/DL (ref 74–99)
GLUCOSE UR STRIP-MCNC: NEGATIVE MG/DL
HCT VFR BLD AUTO: 24.7 % (ref 34–48)
HGB BLD-MCNC: 7.6 G/DL (ref 11.5–15.5)
HGB UR QL STRIP.AUTO: ABNORMAL
KETONES UR STRIP-MCNC: NEGATIVE MG/DL
LEUKOCYTE ESTERASE UR QL STRIP: ABNORMAL
MCH RBC QN AUTO: 28.7 PG (ref 26–35)
MCHC RBC AUTO-ENTMCNC: 30.8 G/DL (ref 32–34.5)
MCV RBC AUTO: 93.2 FL (ref 80–99.9)
NITRITE UR QL STRIP: NEGATIVE
PH UR STRIP: 7.5 [PH] (ref 5–8)
PLATELET # BLD AUTO: 191 K/UL (ref 130–450)
PMV BLD AUTO: 9.8 FL (ref 7–12)
POTASSIUM SERPL-SCNC: 3.6 MMOL/L (ref 3.5–5.1)
PROT SERPL-MCNC: 6.2 G/DL (ref 6.4–8.3)
PROT UR STRIP-MCNC: NEGATIVE MG/DL
RBC # BLD AUTO: 2.65 M/UL (ref 3.5–5.5)
RBC #/AREA URNS HPF: ABNORMAL /HPF
SODIUM SERPL-SCNC: 137 MMOL/L (ref 136–145)
SOURCE WET PREP: NORMAL
SP GR UR STRIP: 1.01 (ref 1–1.03)
T VAGINALIS VAG QL WET PREP: NORMAL
UROBILINOGEN UR STRIP-ACNC: 0.2 EU/DL (ref 0–1)
WBC #/AREA URNS HPF: ABNORMAL /HPF
WBC OTHER # BLD: 4.2 K/UL (ref 4.5–11.5)
YEAST WET PREP: NORMAL

## 2025-07-12 PROCEDURE — 99254 IP/OBS CNSLTJ NEW/EST MOD 60: CPT | Performed by: OBSTETRICS & GYNECOLOGY

## 2025-07-12 PROCEDURE — 76821 MIDDLE CEREBRAL ARTERY ECHO: CPT | Performed by: OBSTETRICS & GYNECOLOGY

## 2025-07-12 PROCEDURE — 85027 COMPLETE CBC AUTOMATED: CPT

## 2025-07-12 PROCEDURE — 6360000002 HC RX W HCPCS: Performed by: OBSTETRICS & GYNECOLOGY

## 2025-07-12 PROCEDURE — 76817 TRANSVAGINAL US OBSTETRIC: CPT | Performed by: OBSTETRICS & GYNECOLOGY

## 2025-07-12 PROCEDURE — 80053 COMPREHEN METABOLIC PANEL: CPT

## 2025-07-12 PROCEDURE — 87210 SMEAR WET MOUNT SALINE/INK: CPT

## 2025-07-12 PROCEDURE — 87491 CHLMYD TRACH DNA AMP PROBE: CPT

## 2025-07-12 PROCEDURE — 87591 N.GONORRHOEAE DNA AMP PROB: CPT

## 2025-07-12 PROCEDURE — 81001 URINALYSIS AUTO W/SCOPE: CPT

## 2025-07-12 PROCEDURE — 96375 TX/PRO/DX INJ NEW DRUG ADDON: CPT

## 2025-07-12 PROCEDURE — 83550 IRON BINDING TEST: CPT

## 2025-07-12 PROCEDURE — G0378 HOSPITAL OBSERVATION PER HR: HCPCS

## 2025-07-12 PROCEDURE — 82607 VITAMIN B-12: CPT

## 2025-07-12 PROCEDURE — 76819 FETAL BIOPHYS PROFIL W/O NST: CPT | Performed by: OBSTETRICS & GYNECOLOGY

## 2025-07-12 PROCEDURE — 76820 UMBILICAL ARTERY ECHO: CPT | Performed by: OBSTETRICS & GYNECOLOGY

## 2025-07-12 PROCEDURE — 83540 ASSAY OF IRON: CPT

## 2025-07-12 PROCEDURE — 82746 ASSAY OF FOLIC ACID SERUM: CPT

## 2025-07-12 PROCEDURE — 2580000003 HC RX 258: Performed by: OBSTETRICS & GYNECOLOGY

## 2025-07-12 PROCEDURE — 96372 THER/PROPH/DIAG INJ SC/IM: CPT

## 2025-07-12 PROCEDURE — 82728 ASSAY OF FERRITIN: CPT

## 2025-07-12 PROCEDURE — 76805 OB US >/= 14 WKS SNGL FETUS: CPT | Performed by: OBSTETRICS & GYNECOLOGY

## 2025-07-12 PROCEDURE — 99221 1ST HOSP IP/OBS SF/LOW 40: CPT | Performed by: NURSE PRACTITIONER

## 2025-07-12 PROCEDURE — 96365 THER/PROPH/DIAG IV INF INIT: CPT

## 2025-07-12 RX ORDER — SODIUM CHLORIDE, SODIUM LACTATE, POTASSIUM CHLORIDE, CALCIUM CHLORIDE 600; 310; 30; 20 MG/100ML; MG/100ML; MG/100ML; MG/100ML
INJECTION, SOLUTION INTRAVENOUS CONTINUOUS
Status: DISCONTINUED | OUTPATIENT
Start: 2025-07-12 | End: 2025-07-25 | Stop reason: HOSPADM

## 2025-07-12 RX ORDER — CALCIUM GLUCONATE 98 MG/ML
1000 INJECTION, SOLUTION INTRAVENOUS PRN
Status: DISCONTINUED | OUTPATIENT
Start: 2025-07-12 | End: 2025-07-25 | Stop reason: HOSPADM

## 2025-07-12 RX ORDER — SODIUM CHLORIDE 0.9 % (FLUSH) 0.9 %
5-40 SYRINGE (ML) INJECTION EVERY 12 HOURS SCHEDULED
Status: DISCONTINUED | OUTPATIENT
Start: 2025-07-12 | End: 2025-07-25 | Stop reason: HOSPADM

## 2025-07-12 RX ORDER — BETAMETHASONE SODIUM PHOSPHATE AND BETAMETHASONE ACETATE 3; 3 MG/ML; MG/ML
12 INJECTION, SUSPENSION INTRA-ARTICULAR; INTRALESIONAL; INTRAMUSCULAR; SOFT TISSUE DAILY
Status: DISCONTINUED | OUTPATIENT
Start: 2025-07-12 | End: 2025-07-13

## 2025-07-12 RX ORDER — MAGNESIUM SULFATE HEPTAHYDRATE 40 MG/ML
4000 INJECTION, SOLUTION INTRAVENOUS ONCE
Status: COMPLETED | OUTPATIENT
Start: 2025-07-12 | End: 2025-07-12

## 2025-07-12 RX ADMIN — MAGNESIUM SULFATE HEPTAHYDRATE 2000 MG/HR: 40 INJECTION, SOLUTION INTRAVENOUS at 16:16

## 2025-07-12 RX ADMIN — SODIUM CHLORIDE, SODIUM LACTATE, POTASSIUM CHLORIDE, AND CALCIUM CHLORIDE: .6; .31; .03; .02 INJECTION, SOLUTION INTRAVENOUS at 15:47

## 2025-07-12 RX ADMIN — MAGNESIUM SULFATE HEPTAHYDRATE 4000 MG: 40 INJECTION, SOLUTION INTRAVENOUS at 15:52

## 2025-07-12 RX ADMIN — BETAMETHASONE SODIUM PHOSPHATE AND BETAMETHASONE ACETATE 12 MG: 3; 3 INJECTION, SUSPENSION INTRA-ARTICULAR; INTRALESIONAL; INTRAMUSCULAR at 15:57

## 2025-07-12 RX ADMIN — IRON SUCROSE 200 MG: 20 INJECTION, SOLUTION INTRAVENOUS at 08:23

## 2025-07-12 NOTE — PROGRESS NOTES
Patient presents to unit with complaints of bleeding. She is a  patient of Dr. Gonzalez. She reports positive fetal movement, denies any cramping or LOF. Placed on EFM and call light in reach.

## 2025-07-12 NOTE — CONSULTS
Julio Gonzalez MD  [address] [place] [address]     RE:  MALINA CALVERT  : 1990   AGE: 34 y.o.    This report has been created using voice recognition software. It may contain errors which are inherent in voice recognition technology.    Dear Dr. Gonzalez:    I saw Malina Calvert for a consultation today for the following indications:    Patient Active Problem List   Diagnosis    Vaginal discharge during pregnancy in third trimester    Short cervical length during pregnancy in third trimester    Normochromic normocytic anemia    Abnormal urinalysis    Vaginal bleeding during pregnancy, antepartum    History of cervical LEEP biopsy affecting care of mother, antepartum     Malina Calvert is a 34 y.o. female, who is G6(1,1,3,2). She has an Estimated Date of Delivery: 10/3/2025 based on her established dates.  She is currently 28 weeks 1 days gestation based on that assessment.     The patient was admitted to the labor and delivery unit for evaluation and management secondary to complaint of vaginal bleeding.  She has no active vaginal bleeding at this time, but complains of dark brown vaginal spotting.  She had no complaint of abdominal pain or tenderness.  Her fetal movement has been good.  She stated that she recently started a new job requiring her to be standing for approximately 10 hours a day.  She had no history of abdominal or cervical trauma.    The patient has a history of having a cervical LEEP procedure performed on 4/10/2025.  Her most recent delivery was at 35 weeks 3 days gestational age on 2023.  The patient is at increased risk for  labor and delivery.    The patient understands that she is at increased risk for having a child with autism spectrum disorder because of the history of autism spectrum disorder in the father to the baby's 2 nieces, 1 of whom is nonverbal at age 5 years. She further understands that autism cannot be detected by  assessment.    EXTREMITIES:  No peripheral edema is noted.  No evidence of cyanosis.    NEUROLOGICAL:  No evidence of focal motor or speech deficit.    PELVIC EXAMINATION:  Transvaginal ultrasound assessment of the cervix was performed. The cervical length was short, measuring 11.9 mm, without funneling of the amniotic membranes.     Admission on 07/12/2025   Component Date Value Ref Range Status    WBC 07/12/2025 4.2 (L)  4.5 - 11.5 k/uL Final    RBC 07/12/2025 2.65 (L)  3.50 - 5.50 m/uL Final    Hemoglobin 07/12/2025 7.6 (L)  11.5 - 15.5 g/dL Final    Hematocrit 07/12/2025 24.7 (L)  34.0 - 48.0 % Final    MCV 07/12/2025 93.2  80.0 - 99.9 fL Final    MCH 07/12/2025 28.7  26.0 - 35.0 pg Final    MCHC 07/12/2025 30.8 (L)  32.0 - 34.5 g/dL Final    RDW 07/12/2025 16.9 (H)  11.5 - 15.0 % Final    Platelets 07/12/2025 191  130 - 450 k/uL Final    MPV 07/12/2025 9.8  7.0 - 12.0 fL Final    Sodium 07/12/2025 137  136 - 145 mmol/L Final    Potassium 07/12/2025 3.6  3.5 - 5.1 mmol/L Final    Chloride 07/12/2025 106  98 - 107 mmol/L Final    CO2 07/12/2025 22  22 - 29 mmol/L Final    Anion Gap 07/12/2025 9  7 - 16 mmol/L Final    Glucose 07/12/2025 92  74 - 99 mg/dL Final    BUN 07/12/2025 5 (L)  6 - 20 mg/dL Final    Creatinine 07/12/2025 0.7  0.5 - 1.0 mg/dL Final    Est, Glom Filt Rate 07/12/2025 >90  >60 mL/min/1.73m2 Final    Calcium 07/12/2025 8.6  8.6 - 10.0 mg/dL Final    Total Protein 07/12/2025 6.2 (L)  6.4 - 8.3 g/dL Final    Albumin 07/12/2025 3.4 (L)  3.5 - 5.2 g/dL Final    Total Bilirubin 07/12/2025 <0.2  0.0 - 1.2 mg/dL Final    Alkaline Phosphatase 07/12/2025 74  35 - 104 U/L Final    ALT 07/12/2025 8  0 - 35 U/L Final    AST 07/12/2025 17  0 - 35 U/L Final    Color, UA 07/12/2025 Yellow  Yellow Final    Turbidity UA 07/12/2025 Clear  Clear Final    Glucose, Ur 07/12/2025 NEGATIVE  NEGATIVE mg/dL Final    Bilirubin, Urine 07/12/2025 NEGATIVE  NEGATIVE Final    Ketones, Urine 07/12/2025 NEGATIVE  NEGATIVE

## 2025-07-12 NOTE — PROGRESS NOTES
Call Dr. Gonzalez for patient update  Lab results reviewed  Placenta fundal on US  Orders received  for  IV venofer once, MFM consult for US prior to discharge  Pt to follow up with infusion center outpatient for  iron infusions

## 2025-07-12 NOTE — H&P
Department of Obstetrics and Gynecology  Labor and Delivery  History & Physical    Patient:  Malina Calvert     Admit Date:  2025  4:03 AM  Medical Record Number:  [id number]    CHIEF COMPLAINT:  vaginal bleeding    PROBLEM LIST:     Patient Active Problem List   Diagnosis    Amniotic fluid leaking    39 weeks gestation of pregnancy    Active  labor, fetus 1    Closed fracture of tooth    Dental infection    Pain due to dental caries    Pain, dental    Vaginal discharge during pregnancy in third trimester           HISTORY OF PRESENT ILLNESS:    The patient is a 34 y.o.  female  at Unknown. Patient presents with a chief complaint as above. Pt reports wiping and noticing a fair amount of blood. She is wearing a pad and has a small amount of dark red blood on her pad.   She denies cramping or abdominal pain, she reports +fm.   She denies having a hx of placenta previa.   She states she started a new job and is on her feet for 10 hrs a day    ESTIMATED DUE DATE: Estimated Date of Delivery: None noted.    PRENATAL CARE:  Complicated by:  uneventful      Past OB History  OB History          6    Para   2    Term   1       1    AB   3    Living   2         SAB   3    IAB        Ectopic        Molar        Multiple   0    Live Births   2                Past Medical History:        Diagnosis Date    Abnormal Pap smear of cervix     colposcopy     Anemia        Past Surgical History:        Procedure Laterality Date    DILATION AND CURETTAGE OF UTERUS      DILATION AND CURETTAGE OF UTERUS N/A 3/3/2021    SUCTION DILATATION AND CURETTAGE performed by Julio Gonzalez MD at [facility] OR    ECHO COMPL W DOP COLOR FLOW  2012         LEEP N/A 4/10/2025    LEEP (PT PREGNANT) performed by Julio Gonzalez MD at [facility] OR    SINUS SURGERY      for epistaxis       Allergies:  Patient has no known allergies.    Social History:    Social History     Socioeconomic History    Marital status:       cyanosis, cords; No calf tenderness; Edema: trace  Fetal heart rate:  Reassuring.  Pelvis:  Adequate pelvis  Cervix: 1 cm / 75% / soft / -2  Contraction frequency:  0 minutes  Membranes:  Intact    Labs:   No results found for this or any previous visit (from the past 72 hours).    ASSESSMENT:   34 y.o.  female at Unknown   Observation  Spec exam noted dark red blood, vaginal walls and cervix appear irritated, possible infection  Wet prep obtained  Pt denies recent intercourse    Patient Active Problem List   Diagnosis    Amniotic fluid leaking    39 weeks gestation of pregnancy    Active  labor, fetus 1    Closed fracture of tooth    Dental infection    Pain due to dental caries    Pain, dental    Vaginal discharge during pregnancy in third trimester     Fetus: Reassuring  GBS: not done    PLAN:  Discussed with dr mcwilliams  Would like bedside sono to assess placenta position  Orders Placed This Encounter    WET PREP, GENITAL     Standing Status:   Standing     Number of Occurrences:   1    C.trachomatis N.gonorrhoeae DNA, Urine     Standing Status:   Standing     Number of Occurrences:   1    CBC     Standing Status:   Standing     Number of Occurrences:   1    Comprehensive Metabolic Panel     Standing Status:   Standing     Number of Occurrences:   1    Urinalysis with Microscopic     Standing Status:   Standing     Number of Occurrences:   1    Place in Observation Service     Standing Status:   Standing     Number of Occurrences:   1     Telemetry/Cardiac Monitoring Required?:   No     Discharge Plan::   Home with Office Follow-up        MIRNA Bhardwaj CNP  2025 4:47 AM

## 2025-07-13 ENCOUNTER — ANCILLARY PROCEDURE (OUTPATIENT)
Age: 35
DRG: 566 | End: 2025-07-13
Payer: COMMERCIAL

## 2025-07-13 LAB
FERRITIN SERPL-MCNC: 104 NG/ML
FOLATE SERPL-MCNC: 14.6 NG/ML (ref 4.6–34.8)
IRON SATN MFR SERPL: ABNORMAL % (ref 15–50)
IRON SERPL-MCNC: 460 UG/DL (ref 37–145)
TIBC SERPL-MCNC: ABNORMAL UG/DL (ref 250–450)
VIT B12 SERPL-MCNC: 522 PG/ML (ref 232–1245)

## 2025-07-13 PROCEDURE — 76817 TRANSVAGINAL US OBSTETRIC: CPT | Performed by: OBSTETRICS & GYNECOLOGY

## 2025-07-13 PROCEDURE — 76820 UMBILICAL ARTERY ECHO: CPT | Performed by: OBSTETRICS & GYNECOLOGY

## 2025-07-13 PROCEDURE — 99231 SBSQ HOSP IP/OBS SF/LOW 25: CPT | Performed by: OBSTETRICS & GYNECOLOGY

## 2025-07-13 PROCEDURE — 96372 THER/PROPH/DIAG INJ SC/IM: CPT

## 2025-07-13 PROCEDURE — 36415 COLL VENOUS BLD VENIPUNCTURE: CPT

## 2025-07-13 PROCEDURE — G0378 HOSPITAL OBSERVATION PER HR: HCPCS

## 2025-07-13 PROCEDURE — 76821 MIDDLE CEREBRAL ARTERY ECHO: CPT | Performed by: OBSTETRICS & GYNECOLOGY

## 2025-07-13 PROCEDURE — 76819 FETAL BIOPHYS PROFIL W/O NST: CPT | Performed by: OBSTETRICS & GYNECOLOGY

## 2025-07-13 PROCEDURE — 76815 OB US LIMITED FETUS(S): CPT | Performed by: OBSTETRICS & GYNECOLOGY

## 2025-07-13 PROCEDURE — 6360000002 HC RX W HCPCS: Performed by: OBSTETRICS & GYNECOLOGY

## 2025-07-13 RX ORDER — BETAMETHASONE SODIUM PHOSPHATE AND BETAMETHASONE ACETATE 3; 3 MG/ML; MG/ML
12 INJECTION, SUSPENSION INTRA-ARTICULAR; INTRALESIONAL; INTRAMUSCULAR; SOFT TISSUE ONCE
Status: COMPLETED | OUTPATIENT
Start: 2025-07-13 | End: 2025-07-13

## 2025-07-13 RX ADMIN — BETAMETHASONE SODIUM PHOSPHATE AND BETAMETHASONE ACETATE 12 MG: 3; 3 INJECTION, SUSPENSION INTRA-ARTICULAR; INTRALESIONAL; INTRAMUSCULAR at 15:03

## 2025-07-13 NOTE — PROGRESS NOTES
Julio Gonzalez MD  [address] [place] [address]     RE:  MALINA CALVERT  : 1990   AGE: 34 y.o.    This report has been created using voice recognition software. It may contain errors which are inherent in voice recognition technology.    Dear Dr. Gonzalez:    I saw Malina Calvetr today for the following indications:    Patient Active Problem List   Diagnosis    Vaginal discharge during pregnancy in third trimester    Short cervical length during pregnancy in third trimester    Normochromic normocytic anemia    Abnormal urinalysis    Vaginal bleeding during pregnancy, antepartum    History of cervical LEEP biopsy affecting care of mother, antepartum    Family history of autism.  Father to the baby's 2 nieces.     Malina Calvert is a 34 y.o. female, who is G6(1,1,3,2). She has an Estimated Date of Delivery: 10/3/2025 based on her established dates.  She is currently 28 weeks 2 days gestation based on that assessment.     The patient was admitted to the labor and delivery unit for evaluation and management secondary to complaint of vaginal bleeding.  She has no active vaginal bleeding at this time, but complains of dark brown vaginal spotting.  She had no complaint of abdominal pain or tenderness.  Her fetal movement has been good.  She stated that she recently started a new job requiring her to be standing for approximately 10 hours a day.  She had no history of abdominal or cervical trauma.    The patient has a history of having a cervical LEEP procedure performed on 4/10/2025.  Her most recent delivery was at 35 weeks 3 days gestational age on 2023.  The patient is at increased risk for  labor and delivery.    The patient understands that she is at increased risk for having a child with autism spectrum disorder because of the history of autism spectrum disorder in the father to the baby's 2 nieces, 1 of whom is nonverbal at age 5 years. She further understands that autism

## 2025-07-13 NOTE — PROGRESS NOTES
Assumed care of patient. Denies any vb lof or ctx's. Reports good fetal movement. Resting comfortably in bed at this time, call light within reach.

## 2025-07-13 NOTE — PROGRESS NOTES
28w2d, Short Cervix.    No bleeding today, no dark vaginal discharge.    Good FM.    Discussed sitting at work (vs standing).    Will await MFM opinion on discharge/FU.    RHYS Gonzalez MD

## 2025-07-13 NOTE — PROGRESS NOTES
33yo BF,  at 28w1d, presented earlier today for vaginal bleeding, Dark red, no associated contractions. No LOF. Good FM.  Was placed on bedrest, MFM Consulted.  Sono showed markedly shorrtened cervix at 11.9 mm.      Had LEEP on 4/10/25 for CIN3, all LsOR Negative for dysplasia.  Had 3 passes of LEEP Perfomed, Upper. Lower and Central margins, estimated total cervical depth of excision about 1-1.1 cm.     Since admission, received Celestone and MgSO4 for Neuroprotection.    MFM Consult much appreciated.    Currently working new job at 40h/wk, standing, no substantial lifting.  Children are 20 lbs and 50 lbs, advised NO Lifting.    Ordered Venofer IV for Anemia.     Await reassessment tomorrow    RHYS Gonzalez MD

## 2025-07-13 NOTE — CONSULTS
PRENATAL NEONATOLOGY CONSULT     Asked to see patient for: Possible pre-term delivery in setting of cervical shortening  Requesting physician: Dr. Ulrich  Primary OB: Dr. Gonzalez  Future pediatrician/family practitioner: Unknown    Malina Calvert is a 34 y.o.  female pregnant at 28w2d with Estimated Date of Delivery: 10/3/25.     OB History    Para Term  AB Living   6 2 1 1 3 2   SAB IAB Ectopic Molar Multiple Live Births   3 0 0 0 0 2      # Outcome Date GA Lbr Toni/2nd Weight Sex Type Anes PTL Lv   6 Current            5  23 35w3d / 00:05 2.72 kg F Vag-Spont None Y GEORGE      Complications: Cord around body      Name: TANYA CALVERT,BABY GIRL MALINA      Apgar1: 7  Apgar5: 8   4 Term 22 39w3d 09:10 / 00:12 3.19 kg F Vag-Spont None N GEORGE      Name: TANYA CALVERT,BABY GIRL MALINA      Apgar1: 9  Apgar5: 9   3 SAB            2 SAB            1 SAB              PRENATAL COURSE / MATERNAL DATA:   Mother's name: Malina Calvert    Prenatal Care: Good     Prenatal Labs:  Maternal blood type: O POSITIVE    GBS: unknown  HBsAg: unknown  Hep C: unknown  Rubella: unknown  RPR/VDRL: unknown  HIV:unknown  GC: pending  Chlamydia: pending  UDS:not done  Glucose Tolerance Test: Unknown  Other Screenings:     Maternal concerns:   Patient Active Problem List   Diagnosis    Vaginal discharge during pregnancy in third trimester    Short cervical length during pregnancy in third trimester    Normochromic normocytic anemia    Abnormal urinalysis    Vaginal bleeding during pregnancy, antepartum    History of cervical LEEP biopsy affecting care of mother, antepartum    Family history of autism.  Father to the baby's 2 nieces.       Home medication during pregnancy:   No current facility-administered medications on file prior to encounter.     Current Outpatient Medications on File Prior to Encounter   Medication Sig Dispense Refill    Prenatal MV-Min-Fe Fum-FA-DHA (PRENATAL+DHA PO) Take by mouth

## 2025-07-14 ENCOUNTER — ANCILLARY PROCEDURE (OUTPATIENT)
Age: 35
DRG: 566 | End: 2025-07-14
Payer: COMMERCIAL

## 2025-07-14 PROCEDURE — 99231 SBSQ HOSP IP/OBS SF/LOW 25: CPT | Performed by: OBSTETRICS & GYNECOLOGY

## 2025-07-14 PROCEDURE — G0378 HOSPITAL OBSERVATION PER HR: HCPCS

## 2025-07-14 PROCEDURE — 76821 MIDDLE CEREBRAL ARTERY ECHO: CPT | Performed by: OBSTETRICS & GYNECOLOGY

## 2025-07-14 PROCEDURE — 76820 UMBILICAL ARTERY ECHO: CPT | Performed by: OBSTETRICS & GYNECOLOGY

## 2025-07-14 PROCEDURE — 76817 TRANSVAGINAL US OBSTETRIC: CPT | Performed by: OBSTETRICS & GYNECOLOGY

## 2025-07-14 PROCEDURE — 76815 OB US LIMITED FETUS(S): CPT | Performed by: OBSTETRICS & GYNECOLOGY

## 2025-07-14 PROCEDURE — 87086 URINE CULTURE/COLONY COUNT: CPT

## 2025-07-14 PROCEDURE — 76819 FETAL BIOPHYS PROFIL W/O NST: CPT | Performed by: OBSTETRICS & GYNECOLOGY

## 2025-07-14 NOTE — PROGRESS NOTES
Call received from Dr. Ulrich. Updated on FHT tracing, 135 bpm, moderate variability with accelerations and episodic variable decelerations to 70 bpm and lasting 10-30 seconds that resolve with position change. RN to continue current plan of care.

## 2025-07-14 NOTE — PROGRESS NOTES
28w3d, Short Cervix, now at 8.9 mm w Funneling.  Denies Ctxs.  Denies vaginal Bleeding or fluid leak.  Good FM.  Will begin Progesterone Suppositories (Pres[facility] thru [employer] [facility], and patient will have family member bring to her at hospital).   Continue Bedrest w BRPs.   RHYS Gonzalez MD

## 2025-07-14 NOTE — PROGRESS NOTES
RN at bedside. EFM applied for TID monitoring. Patient denies any vb, or ctx overnight. Reports some lof, and +fm. Denies any needs or complaints at this time. Call light within reach.

## 2025-07-14 NOTE — PROGRESS NOTES
Julio Gonzalez MD  [address] [place] [address]     RE:  MALINA CALVERT  : 1990   AGE: 34 y.o.    This report has been created using voice recognition software. It may contain errors which are inherent in voice recognition technology.    Dear Dr. Gonzalez:    I saw Malina Calvert today for the following indications:    Patient Active Problem List   Diagnosis    Vaginal discharge during pregnancy in third trimester    Short cervical length during pregnancy in third trimester    Normochromic normocytic anemia    Abnormal urinalysis    Vaginal bleeding during pregnancy, antepartum    History of cervical LEEP biopsy affecting care of mother, antepartum    Family history of autism.  Father to the baby's 2 nieces.     Malina Calvert is a 34 y.o. female, who is G6(1,1,3,2). She has an Estimated Date of Delivery: 10/3/2025 based on her established dates.  She is currently 28 weeks 3 days gestation based on that assessment.     The patient was initially admitted to the labor and delivery unit for evaluation and management secondary to complaint of vaginal bleeding.  She has no active vaginal bleeding at this time, but complained of dark brown vaginal spotting.  She had no complaint of abdominal pain or tenderness.  Her fetal movement has been good.  She stated that she recently started a new job requiring her to be standing for approximately 10 hours a day.  She had no history of abdominal or cervical trauma.  She has not been sexually active recently.    The patient has a history of having a cervical LEEP procedure performed on 4/10/2025.  Her most recent delivery was at 35 weeks 3 days gestational age on 2023.  The patient is at increased risk for  labor and delivery.    The patient understands that she is at increased risk for having a child with autism spectrum disorder because of the history of autism spectrum disorder in the father to the baby's 2 nieces, 1 of whom is

## 2025-07-14 NOTE — PROGRESS NOTES
Care of patient taken over. Call light within reach. Patient denies leaking of fluid, vaginal bleeding, ctx. +FM

## 2025-07-15 ENCOUNTER — ANCILLARY PROCEDURE (OUTPATIENT)
Age: 35
DRG: 566 | End: 2025-07-15
Payer: COMMERCIAL

## 2025-07-15 LAB
CHLAMYDIA DNA UR QL NAA+PROBE: NEGATIVE
MICROORGANISM SPEC CULT: ABNORMAL
N GONORRHOEA DNA UR QL NAA+PROBE: NEGATIVE
SERVICE CMNT-IMP: ABNORMAL
SPECIMEN DESCRIPTION: ABNORMAL
SPECIMEN DESCRIPTION: NORMAL

## 2025-07-15 PROCEDURE — 76820 UMBILICAL ARTERY ECHO: CPT | Performed by: OBSTETRICS & GYNECOLOGY

## 2025-07-15 PROCEDURE — G0378 HOSPITAL OBSERVATION PER HR: HCPCS

## 2025-07-15 PROCEDURE — 76817 TRANSVAGINAL US OBSTETRIC: CPT | Performed by: OBSTETRICS & GYNECOLOGY

## 2025-07-15 PROCEDURE — 76819 FETAL BIOPHYS PROFIL W/O NST: CPT | Performed by: OBSTETRICS & GYNECOLOGY

## 2025-07-15 PROCEDURE — 99231 SBSQ HOSP IP/OBS SF/LOW 25: CPT | Performed by: OBSTETRICS & GYNECOLOGY

## 2025-07-15 PROCEDURE — 76815 OB US LIMITED FETUS(S): CPT | Performed by: OBSTETRICS & GYNECOLOGY

## 2025-07-15 NOTE — PROGRESS NOTES
Julio Gonzalez MD  [address] [place] [address]     RE:  MALINA CALVERT  : 1990   AGE: 34 y.o.    This report has been created using voice recognition software. It may contain errors which are inherent in voice recognition technology.    Dear Dr. Gonzalez:    I saw Malina Calvert today for the following indications:    Patient Active Problem List   Diagnosis    Vaginal discharge during pregnancy in third trimester    Short cervical length during pregnancy in third trimester    Normochromic normocytic anemia    Abnormal urinalysis    Vaginal bleeding during pregnancy, antepartum    History of cervical LEEP biopsy affecting care of mother, antepartum    Family history of autism.  Father to the baby's 2 nieces.     Malina Calvert is a 34 y.o. female, who is G6(1,1,3,2). She has an Estimated Date of Delivery: 10/3/2025 based on her established dates.  She is currently 28 weeks 4 days gestation based on that assessment.     The patient was initially admitted to the labor and delivery unit for evaluation and management secondary to complaint of vaginal bleeding.  She has no active vaginal bleeding at this time, but complained of dark brown vaginal spotting.  She had no complaint of abdominal pain or tenderness.  Her fetal movement has been good.  She stated that she recently started a new job requiring her to be standing for approximately 10 hours a day.  She had no history of abdominal or cervical trauma.  She had not been sexually active recently.    The patient has a history of having a cervical LEEP procedure performed on 4/10/2025.  Her most recent delivery was at 35 weeks 3 days gestational age on 2023.  The patient is at increased risk for  labor and delivery.    The patient understands that she is at increased risk for having a child with autism spectrum disorder because of the history of autism spectrum disorder in the father to the baby's 2 nieces, 1 of whom is  of cervix     colposcopy     Anemia        Past Surgical History:   Procedure Laterality Date    DILATION AND CURETTAGE OF UTERUS      DILATION AND CURETTAGE OF UTERUS N/A 3/3/2021    SUCTION DILATATION AND CURETTAGE performed by Julio Gonzalez MD at [facility] OR    ECHO COMPL W DOP COLOR FLOW  5/2/2012         LEEP N/A 4/10/2025    LEEP (PT PREGNANT) performed by Julio Gonzalez MD at [facility] OR    SINUS SURGERY      for epistaxis       No Known Allergies      Current Facility-Administered Medications:     lactated ringers infusion, , IntraVENous, Continuous, Diego Ulrich MD, Last Rate: 125 mL/hr at 07/12/25 1547, New Bag at 07/12/25 1547    sodium chloride flush 0.9 % injection 5-40 mL, 5-40 mL, IntraVENous, 2 times per day, Diego Ulrich MD    calcium gluconate 10 % IntraVENous 1,000 mg, 1,000 mg, IntraVENous, PRN, Diego Ulrich MD    Social History     Tobacco Use    Smoking status: Never    Smokeless tobacco: Never   Substance Use Topics    Alcohol use: Not Currently       FAMILY MEDICAL HISTORY:   Negative for congenital abnormalities, autism, genetic disease and mental retardation, not listed above.     Review of Systems :   CONSTITUTIONAL : No fever, no chills   HEENT : No headache, no visual changes, no rhinorrhea, no sore throat   CARDIOVASCULAR : No pain, no palpitations, no edema   RESPIRATORY : No pain, no shortness of breath   GASTROINTESTINAL : No N/V, no D/C, no abdominal pain   GENITOURINARY : No dysuria, hematuria and no incontinence   MUSCULOSKELETAL : No myalgia, No back pain  NEUROLOGICAL : No numbness, no tingling, no tremors. No history of seizures  ALL OTHER SYSTEMS WERE REPORTED AS NEGATIVE.    PERTINENT PHYSICAL EXAMINATION:   Patient Vitals for the past 24 hrs:   BP Temp Temp src Pulse Resp   07/15/25 0849 (!) 119/55 98 °F (36.7 °C) Oral 78 16   07/14/25 2354 (!) 92/53 97.9 °F (36.6 °C) Oral 62 16   07/14/25 2019 (!) 109/53 98.3 °F (36.8 °C) Oral 72 16     GENERAL:   The

## 2025-07-15 NOTE — PROGRESS NOTES
RN at bedside. Patient denies ctxs, VB, & LOF. Patient percevies +FM. Patient requesting to be taken off monitor at this time. EFM removed.

## 2025-07-15 NOTE — PROGRESS NOTES
This RN at bedside. Pt states +FM. Denies any VB or LOF. Denies any s/s of ctx's. VSS. Placed on EFM. Call light within reach.

## 2025-07-15 NOTE — PROGRESS NOTES
Spiritual Health History and Assessment/Progress Note  [facility]    Initial Encounter, Spiritual/Emotional Needs,  ,  ,      Name: Malina Calvert MRN: [id number]    Age: 34 y.o.     Sex: female   Language: English   Restoration: Yazidism   Vaginal discharge during pregnancy in third trimester     Date: 7/15/2025                           Spiritual Assessment began in [facility] 3S HIGH RISK AP/PP MATERNITY        Referral/Consult From: Rounding   Encounter Overview/Reason: Initial Encounter, Spiritual/Emotional Needs  Service Provided For: Patient    Sadaf, Belief, Meaning:   Patient identifies as spiritual, is connected with a sadaf tradition or spiritual practice, and has beliefs or practices that help with coping during difficult times  Family/Friends No family/friends present      Importance and Influence:  Patient has spiritual/personal beliefs that influence decisions regarding their health  Family/Friends No family/friends present    Community:  Patient feels well-supported. Support system includes: Parent/s and Children  Family/Friends No family/friends present    Assessment and Plan of Care:     Patient Interventions include: Facilitated expression of thoughts and feelings, Explored spiritual coping/struggle/distress, Engaged in theological reflection, Affirmed coping skills/support systems, and Provided sacramental/Alevism ritual  Family/Friends Interventions include: No family/friends present    Patient Plan of Care: Spiritual Care available upon further referral  Family/Friends Plan of Care: Spiritual Care available upon further referral    Electronically signed by DERECK Ortiz on 7/15/2025 at 3:12 PM

## 2025-07-16 ENCOUNTER — ANCILLARY PROCEDURE (OUTPATIENT)
Age: 35
DRG: 566 | End: 2025-07-16
Payer: COMMERCIAL

## 2025-07-16 LAB
25(OH)D3 SERPL-MCNC: 45.7 NG/ML (ref 30–100)
ABO + RH BLD: NORMAL
ALBUMIN SERPL-MCNC: 4 G/DL (ref 3.5–5.2)
ALP SERPL-CCNC: 92 U/L (ref 35–104)
ALT SERPL-CCNC: 13 U/L (ref 0–35)
ANION GAP SERPL CALCULATED.3IONS-SCNC: 13 MMOL/L (ref 7–16)
ARM BAND NUMBER: NORMAL
AST SERPL-CCNC: 15 U/L (ref 0–35)
BACTERIA URNS QL MICRO: ABNORMAL
BASOPHILS # BLD: 0.02 K/UL (ref 0–0.2)
BASOPHILS NFR BLD: 0 % (ref 0–2)
BILIRUB SERPL-MCNC: <0.2 MG/DL (ref 0–1.2)
BILIRUB UR QL STRIP: NEGATIVE
BLOOD BANK SAMPLE EXPIRATION: NORMAL
BLOOD GROUP ANTIBODIES SERPL: NEGATIVE
BUN SERPL-MCNC: 6 MG/DL (ref 6–20)
CALCIUM SERPL-MCNC: 9.3 MG/DL (ref 8.6–10)
CHLORIDE SERPL-SCNC: 104 MMOL/L (ref 98–107)
CLARITY UR: CLEAR
CO2 SERPL-SCNC: 22 MMOL/L (ref 22–29)
COLOR UR: YELLOW
CREAT SERPL-MCNC: 0.6 MG/DL (ref 0.5–1)
CREAT UR-MCNC: 82.6 MG/DL (ref 29–226)
EOSINOPHIL # BLD: 0.11 K/UL (ref 0.05–0.5)
EOSINOPHILS RELATIVE PERCENT: 1 % (ref 0–6)
EPI CELLS #/AREA URNS HPF: ABNORMAL /HPF
ERYTHROCYTE [DISTWIDTH] IN BLOOD BY AUTOMATED COUNT: 17.3 % (ref 11.5–15)
FERRITIN SERPL-MCNC: 85 NG/ML
FOLATE SERPL-MCNC: 8.7 NG/ML (ref 4.6–34.8)
GFR, ESTIMATED: >90 ML/MIN/1.73M2
GLUCOSE SERPL-MCNC: 80 MG/DL (ref 74–99)
GLUCOSE UR STRIP-MCNC: NEGATIVE MG/DL
HBA1C MFR BLD: 5.5 % (ref 4–5.6)
HCT VFR BLD AUTO: 29.9 % (ref 34–48)
HGB BLD-MCNC: 9.6 G/DL (ref 11.5–15.5)
HGB UR QL STRIP.AUTO: ABNORMAL
IMM GRANULOCYTES # BLD AUTO: 0.04 K/UL (ref 0–0.58)
IMM GRANULOCYTES NFR BLD: 1 % (ref 0–5)
IMM RETICS NFR: 36.2 % (ref 3–15.9)
IRON SATN MFR SERPL: 14 % (ref 15–50)
IRON SERPL-MCNC: 68 UG/DL (ref 37–145)
KETONES UR STRIP-MCNC: NEGATIVE MG/DL
LDH SERPL-CCNC: 170 U/L (ref 135–214)
LEUKOCYTE ESTERASE UR QL STRIP: ABNORMAL
LYMPHOCYTES NFR BLD: 2.08 K/UL (ref 1.5–4)
LYMPHOCYTES RELATIVE PERCENT: 24 % (ref 20–42)
MAGNESIUM SERPL-MCNC: 1.6 MG/DL (ref 1.6–2.6)
MCH RBC QN AUTO: 29.4 PG (ref 26–35)
MCHC RBC AUTO-ENTMCNC: 32.1 G/DL (ref 32–34.5)
MCV RBC AUTO: 91.7 FL (ref 80–99.9)
MONOCYTES NFR BLD: 0.79 K/UL (ref 0.1–0.95)
MONOCYTES NFR BLD: 9 % (ref 2–12)
NEUTROPHILS NFR BLD: 65 % (ref 43–80)
NEUTS SEG NFR BLD: 5.65 K/UL (ref 1.8–7.3)
NITRITE UR QL STRIP: NEGATIVE
PH UR STRIP: 6 [PH] (ref 5–8)
PLATELET # BLD AUTO: 245 K/UL (ref 130–450)
PMV BLD AUTO: 10.2 FL (ref 7–12)
POTASSIUM SERPL-SCNC: 3.8 MMOL/L (ref 3.5–5.1)
PROT SERPL-MCNC: 7.4 G/DL (ref 6.4–8.3)
PROT UR STRIP-MCNC: NEGATIVE MG/DL
RBC # BLD AUTO: 3.26 M/UL (ref 3.5–5.5)
RBC #/AREA URNS HPF: ABNORMAL /HPF
RETIC HEMOGLOBIN: 32.3 PG (ref 28.2–36.6)
RETICS # AUTO: 0.09 M/UL
RETICS/RBC NFR AUTO: 2.8 % (ref 0.4–1.9)
SODIUM SERPL-SCNC: 138 MMOL/L (ref 136–145)
SP GR UR STRIP: 1.01 (ref 1–1.03)
T4 FREE SERPL-MCNC: 0.9 NG/DL (ref 0.9–1.7)
THYROPEROXIDASE AB SERPL IA-ACNC: 9 IU/ML (ref 0–34)
TIBC SERPL-MCNC: 471 UG/DL (ref 250–450)
TOTAL PROTEIN, URINE: 10 MG/DL (ref 0–12)
TSH SERPL DL<=0.05 MIU/L-ACNC: 1.51 UIU/ML (ref 0.27–4.2)
URATE SERPL-MCNC: 2.6 MG/DL (ref 2.4–5.7)
URINE TOTAL PROTEIN CREATININE RATIO: 0.12 (ref 0–0.2)
UROBILINOGEN UR STRIP-ACNC: 0.2 EU/DL (ref 0–1)
VIT B12 SERPL-MCNC: 446 PG/ML (ref 232–1245)
WBC #/AREA URNS HPF: ABNORMAL /HPF
WBC OTHER # BLD: 8.7 K/UL (ref 4.5–11.5)

## 2025-07-16 PROCEDURE — 83550 IRON BINDING TEST: CPT

## 2025-07-16 PROCEDURE — 85613 RUSSELL VIPER VENOM DILUTED: CPT

## 2025-07-16 PROCEDURE — 83036 HEMOGLOBIN GLYCOSYLATED A1C: CPT

## 2025-07-16 PROCEDURE — 86800 THYROGLOBULIN ANTIBODY: CPT

## 2025-07-16 PROCEDURE — 85300 ANTITHROMBIN III ACTIVITY: CPT

## 2025-07-16 PROCEDURE — 82607 VITAMIN B-12: CPT

## 2025-07-16 PROCEDURE — 99233 SBSQ HOSP IP/OBS HIGH 50: CPT | Performed by: OBSTETRICS & GYNECOLOGY

## 2025-07-16 PROCEDURE — 87086 URINE CULTURE/COLONY COUNT: CPT

## 2025-07-16 PROCEDURE — 84550 ASSAY OF BLOOD/URIC ACID: CPT

## 2025-07-16 PROCEDURE — 82746 ASSAY OF FOLIC ACID SERUM: CPT

## 2025-07-16 PROCEDURE — G0378 HOSPITAL OBSERVATION PER HR: HCPCS

## 2025-07-16 PROCEDURE — 86850 RBC ANTIBODY SCREEN: CPT

## 2025-07-16 PROCEDURE — 82728 ASSAY OF FERRITIN: CPT

## 2025-07-16 PROCEDURE — 85730 THROMBOPLASTIN TIME PARTIAL: CPT

## 2025-07-16 PROCEDURE — 86901 BLOOD TYPING SEROLOGIC RH(D): CPT

## 2025-07-16 PROCEDURE — 76821 MIDDLE CEREBRAL ARTERY ECHO: CPT | Performed by: OBSTETRICS & GYNECOLOGY

## 2025-07-16 PROCEDURE — 86147 CARDIOLIPIN ANTIBODY EA IG: CPT

## 2025-07-16 PROCEDURE — 85045 AUTOMATED RETICULOCYTE COUNT: CPT

## 2025-07-16 PROCEDURE — 86146 BETA-2 GLYCOPROTEIN ANTIBODY: CPT

## 2025-07-16 PROCEDURE — 6370000000 HC RX 637 (ALT 250 FOR IP): Performed by: OBSTETRICS & GYNECOLOGY

## 2025-07-16 PROCEDURE — 84443 ASSAY THYROID STIM HORMONE: CPT

## 2025-07-16 PROCEDURE — 84439 ASSAY OF FREE THYROXINE: CPT

## 2025-07-16 PROCEDURE — G0378 HOSPITAL OBSERVATION PER HR: HCPCS | Performed by: OBSTETRICS & GYNECOLOGY

## 2025-07-16 PROCEDURE — 82570 ASSAY OF URINE CREATININE: CPT

## 2025-07-16 PROCEDURE — 86376 MICROSOMAL ANTIBODY EACH: CPT

## 2025-07-16 PROCEDURE — 80053 COMPREHEN METABOLIC PANEL: CPT

## 2025-07-16 PROCEDURE — 81001 URINALYSIS AUTO W/SCOPE: CPT

## 2025-07-16 PROCEDURE — 76817 TRANSVAGINAL US OBSTETRIC: CPT | Performed by: OBSTETRICS & GYNECOLOGY

## 2025-07-16 PROCEDURE — 76820 UMBILICAL ARTERY ECHO: CPT | Performed by: OBSTETRICS & GYNECOLOGY

## 2025-07-16 PROCEDURE — 83540 ASSAY OF IRON: CPT

## 2025-07-16 PROCEDURE — 83615 LACTATE (LD) (LDH) ENZYME: CPT

## 2025-07-16 PROCEDURE — 86900 BLOOD TYPING SEROLOGIC ABO: CPT

## 2025-07-16 PROCEDURE — 85610 PROTHROMBIN TIME: CPT

## 2025-07-16 PROCEDURE — 76815 OB US LIMITED FETUS(S): CPT | Performed by: OBSTETRICS & GYNECOLOGY

## 2025-07-16 PROCEDURE — 84156 ASSAY OF PROTEIN URINE: CPT

## 2025-07-16 PROCEDURE — 83735 ASSAY OF MAGNESIUM: CPT

## 2025-07-16 PROCEDURE — 85303 CLOT INHIBIT PROT C ACTIVITY: CPT

## 2025-07-16 PROCEDURE — 83020 HEMOGLOBIN ELECTROPHORESIS: CPT

## 2025-07-16 PROCEDURE — 85025 COMPLETE CBC W/AUTO DIFF WBC: CPT

## 2025-07-16 PROCEDURE — 82306 VITAMIN D 25 HYDROXY: CPT

## 2025-07-16 PROCEDURE — 76819 FETAL BIOPHYS PROFIL W/O NST: CPT | Performed by: OBSTETRICS & GYNECOLOGY

## 2025-07-16 PROCEDURE — 85306 CLOT INHIBIT PROT S FREE: CPT

## 2025-07-16 RX ORDER — PRENATAL WITH FERROUS FUM AND FOLIC ACID 3080; 920; 120; 400; 22; 1.84; 3; 20; 10; 1; 12; 200; 27; 25; 2 [IU]/1; [IU]/1; MG/1; [IU]/1; MG/1; MG/1; MG/1; MG/1; MG/1; MG/1; UG/1; MG/1; MG/1; MG/1; MG/1
1 TABLET ORAL DAILY
Status: DISCONTINUED | OUTPATIENT
Start: 2025-07-16 | End: 2025-07-25 | Stop reason: HOSPADM

## 2025-07-16 RX ORDER — ASPIRIN 81 MG/1
81 TABLET, CHEWABLE ORAL DAILY
Status: DISCONTINUED | OUTPATIENT
Start: 2025-07-16 | End: 2025-07-25 | Stop reason: HOSPADM

## 2025-07-16 RX ORDER — PROGESTERONE 200 MG/1
CAPSULE ORAL
Qty: 30 CAPSULE | Refills: 1 | Status: SHIPPED | OUTPATIENT
Start: 2025-07-16

## 2025-07-16 RX ORDER — PROGESTERONE 200 MG/1
CAPSULE ORAL
Qty: 30 CAPSULE | Refills: 1 | Status: SHIPPED | OUTPATIENT
Start: 2025-07-16 | End: 2025-07-16

## 2025-07-16 RX ORDER — FOLIC ACID 1 MG/1
1 TABLET ORAL DAILY
Status: DISCONTINUED | OUTPATIENT
Start: 2025-07-16 | End: 2025-07-25 | Stop reason: HOSPADM

## 2025-07-16 RX ADMIN — FOLIC ACID 1 MG: 1 TABLET ORAL at 14:21

## 2025-07-16 RX ADMIN — PRENATAL WITH FERROUS FUM AND FOLIC ACID 1 TABLET: 3080; 920; 120; 400; 22; 1.84; 3; 20; 10; 1; 12; 200; 27; 25; 2 TABLET ORAL at 14:21

## 2025-07-16 RX ADMIN — ASPIRIN 81 MG: 81 TABLET, CHEWABLE ORAL at 14:21

## 2025-07-16 NOTE — PROGRESS NOTES
Assumed pt care. Pt resting in bed, ate a bagel. EFM applied for morning monitoring. VSS. Call light within reach. Denies LOF, VB, or CTXs. Perceives +FM.

## 2025-07-16 NOTE — PROGRESS NOTES
Dr. Loo at nurses station, ordered labs, and states if pt blood sugars remain stable and pt feeling better pt can be discharged and follow up in the office.

## 2025-07-16 NOTE — PROGRESS NOTES
poor fetal growth,  delivery, and  section.  Thus, increased fetal surveillance is recommended with serial fetal growth monitoring every 3-4 weeks starting at 24-26 weeks' gestation.  She should monitor fetal kick counts daily starting at 28 weeks' gestation.  Additional surveillance may be indicated if any additional complications arise.    Additional maternal evaluation was recommended with a HgbA1C, thyroid function studies/thyroid peroxidase antibody, and antiphospholipid antibody screening.    --Testing ordered today    A maternal and paternal karyotype should be considered with any future losses.    And expanded carrier panel should be considered for both the patient and her partner.    A low dose aspirin was recommended to reduce her risk for recurrence.  Risks and benefits of low dose aspirin in pregnancy were reviewed.  She can take 81 mg daily.  She should continue this for the remainder of her pregnancy and for 6 to 8 weeks postpartum.     10 .  Routine OB -- Daily PNV  --GBS unknown    11.  Fetal well-being --  NST TID  --Continue growth scans q 3 wks. Last growth US: 28 weeks 1 day, estimated fetal weight 2 pounds 9 ounces, 50th percentile  --Fetal heart tones q shift.  -- Status post betamethasone -  -- Candidate for rescue steroids on/after   -- Status post magnesium sulfate for neuroprotection -    12.  DVT prophylaxis -- Continue LEROY hose    --The total time spent on today's visit was 55 minutes.  This included preparation for the consultation (i.e. reviewing prior external notes and test results), performance of a medically appropriate history and examination, counseling, orders for medications, tests or other procedures, and coordination of care.  Greater than 50% of the time was spent face-to-face with the patient and with counseling and coordination of care.  This time is exclusive of procedures performed.   I answered all of  the patient's questions to her  satisfaction.      --If you have any questions regarding her management, please contact me at your convenience and thank you for allowing me to participate in her care.        Chen Loo MD, FACOG [facility]  [phone]        *All or parts of this note may have been generated using a voice recognition program. There may be typo, grammar, or Word substitution errors that have escaped my review of this note.

## 2025-07-17 ENCOUNTER — ANCILLARY PROCEDURE (OUTPATIENT)
Age: 35
DRG: 566 | End: 2025-07-17
Payer: COMMERCIAL

## 2025-07-17 PROBLEM — O26.879 SHORT CERVIX AFFECTING PREGNANCY: Status: ACTIVE | Noted: 2025-07-17

## 2025-07-17 LAB
AT III ACT/NOR PPP CHRO: 115 % (ref 83–121)
PATH REV BLD -IMP: NORMAL
PROTEIN C ACTIVITY: >120 % (ref 68–165)

## 2025-07-17 PROCEDURE — 76820 UMBILICAL ARTERY ECHO: CPT | Performed by: OBSTETRICS & GYNECOLOGY

## 2025-07-17 PROCEDURE — 76819 FETAL BIOPHYS PROFIL W/O NST: CPT | Performed by: OBSTETRICS & GYNECOLOGY

## 2025-07-17 PROCEDURE — 76815 OB US LIMITED FETUS(S): CPT | Performed by: OBSTETRICS & GYNECOLOGY

## 2025-07-17 PROCEDURE — 76817 TRANSVAGINAL US OBSTETRIC: CPT | Performed by: OBSTETRICS & GYNECOLOGY

## 2025-07-17 PROCEDURE — 2500000003 HC RX 250 WO HCPCS: Performed by: OBSTETRICS & GYNECOLOGY

## 2025-07-17 PROCEDURE — 1220000000 HC SEMI PRIVATE OB R&B

## 2025-07-17 PROCEDURE — 6370000000 HC RX 637 (ALT 250 FOR IP): Performed by: OBSTETRICS & GYNECOLOGY

## 2025-07-17 PROCEDURE — 2580000003 HC RX 258: Performed by: OBSTETRICS & GYNECOLOGY

## 2025-07-17 PROCEDURE — 6360000002 HC RX W HCPCS: Performed by: OBSTETRICS & GYNECOLOGY

## 2025-07-17 PROCEDURE — 76821 MIDDLE CEREBRAL ARTERY ECHO: CPT | Performed by: OBSTETRICS & GYNECOLOGY

## 2025-07-17 PROCEDURE — 99232 SBSQ HOSP IP/OBS MODERATE 35: CPT | Performed by: OBSTETRICS & GYNECOLOGY

## 2025-07-17 RX ORDER — CETIRIZINE HYDROCHLORIDE 10 MG/1
10 TABLET ORAL ONCE
Status: COMPLETED | OUTPATIENT
Start: 2025-07-17 | End: 2025-07-17

## 2025-07-17 RX ORDER — CHOLECALCIFEROL (VITAMIN D3) 50 MCG
2000 TABLET ORAL DAILY
Status: DISCONTINUED | OUTPATIENT
Start: 2025-07-17 | End: 2025-07-25 | Stop reason: HOSPADM

## 2025-07-17 RX ADMIN — CETIRIZINE HYDROCHLORIDE 10 MG: 10 TABLET, FILM COATED ORAL at 13:39

## 2025-07-17 RX ADMIN — ASPIRIN 81 MG: 81 TABLET, CHEWABLE ORAL at 08:32

## 2025-07-17 RX ADMIN — Medication 2000 UNITS: at 13:39

## 2025-07-17 RX ADMIN — PRENATAL WITH FERROUS FUM AND FOLIC ACID 1 TABLET: 3080; 920; 120; 400; 22; 1.84; 3; 20; 10; 1; 12; 200; 27; 25; 2 TABLET ORAL at 08:32

## 2025-07-17 RX ADMIN — IRON SUCROSE 200 MG: 20 INJECTION, SOLUTION INTRAVENOUS at 14:23

## 2025-07-17 RX ADMIN — SODIUM CHLORIDE, PRESERVATIVE FREE 10 ML: 5 INJECTION INTRAVENOUS at 13:40

## 2025-07-17 RX ADMIN — FOLIC ACID 1 MG: 1 TABLET ORAL at 08:32

## 2025-07-17 NOTE — PROGRESS NOTES
RN at bedside. Patient resting in bed. No complaints at this time. AM meds given. Patient will call out after breakfast for monitoring. Call light within reach.

## 2025-07-18 ENCOUNTER — ANCILLARY PROCEDURE (OUTPATIENT)
Age: 35
DRG: 566 | End: 2025-07-18
Payer: COMMERCIAL

## 2025-07-18 LAB
B2 GLYCOPROT1 IGA SERPL IA-ACNC: 1.9 ELISA U/ML (ref 0–7)
B2 GLYCOPROT1 IGG SERPL IA-ACNC: 1.8 ELISA U/ML (ref 0–7)
B2 GLYCOPROT1 IGM SERPL IA-ACNC: <0.9 ELISA U/ML (ref 0–7)
CARDIOLIPIN IGA SER IA-ACNC: 1.8 APL (ref 0–14)
CARDIOLIPIN IGG SER IA-ACNC: 1.3 GPL (ref 0–10)
CARDIOLIPIN IGM SER IA-ACNC: <0.8 MPL (ref 0–10)
HGB ELECTROPHORESIS INTERP: NORMAL
MICROORGANISM SPEC CULT: ABNORMAL
PATHOLOGIST: NORMAL
SERVICE CMNT-IMP: ABNORMAL
SPECIMEN DESCRIPTION: ABNORMAL
THYROGLOBULIN AB: 12 IU/ML (ref 0–40)

## 2025-07-18 PROCEDURE — 99232 SBSQ HOSP IP/OBS MODERATE 35: CPT | Performed by: OBSTETRICS & GYNECOLOGY

## 2025-07-18 PROCEDURE — 76817 TRANSVAGINAL US OBSTETRIC: CPT | Performed by: OBSTETRICS & GYNECOLOGY

## 2025-07-18 PROCEDURE — 76820 UMBILICAL ARTERY ECHO: CPT | Performed by: OBSTETRICS & GYNECOLOGY

## 2025-07-18 PROCEDURE — 76819 FETAL BIOPHYS PROFIL W/O NST: CPT | Performed by: OBSTETRICS & GYNECOLOGY

## 2025-07-18 PROCEDURE — 76821 MIDDLE CEREBRAL ARTERY ECHO: CPT | Performed by: OBSTETRICS & GYNECOLOGY

## 2025-07-18 PROCEDURE — 6370000000 HC RX 637 (ALT 250 FOR IP): Performed by: OBSTETRICS & GYNECOLOGY

## 2025-07-18 PROCEDURE — 76815 OB US LIMITED FETUS(S): CPT | Performed by: OBSTETRICS & GYNECOLOGY

## 2025-07-18 PROCEDURE — 1220000000 HC SEMI PRIVATE OB R&B

## 2025-07-18 RX ADMIN — FOLIC ACID 1 MG: 1 TABLET ORAL at 08:27

## 2025-07-18 RX ADMIN — Medication 2000 UNITS: at 08:27

## 2025-07-18 RX ADMIN — ASPIRIN 81 MG: 81 TABLET, CHEWABLE ORAL at 08:27

## 2025-07-18 RX ADMIN — PRENATAL WITH FERROUS FUM AND FOLIC ACID 1 TABLET: 3080; 920; 120; 400; 22; 1.84; 3; 20; 10; 1; 12; 200; 27; 25; 2 TABLET ORAL at 08:27

## 2025-07-18 NOTE — PROGRESS NOTES
[facility] Follow-Up Consultation/Antepartum Note    S:  The patient reports feeling well.  She notes fetal movement.  She denies any leaking of fluid or vaginal bleeding.  She reports having occasional contractions.  She denies having any signs or symptoms of preeclampsia.       O:   Patient Vitals for the past 24 hrs:   BP Temp Temp src Pulse Resp SpO2   07/18/25 0830 (!) 115/56 97.7 °F (36.5 °C) Oral 72 15 99 %   07/17/25 2009 (!) 105/55 98 °F (36.7 °C) Oral 81 16 --        Gen NAD  CV RRR  Lungs CTA B  Abd Gravid/soft/NTTP/NABS  Ext no edema BLE, no calf TTP BLE, +1 patellar reflexes BLE, no clonus BLE    Ultrasound 28 wk 5 days:   A single intrauterine gestation was seen in cephalic presentation with a heart rate of 158 bpm.  Placenta is posterior.  CATALINA 10.4 cm.  BPP 8/8.  Umbilical artery Dopplers normal.  MCA Dopplers normal.  CPR PI normal.  Transvaginal cervical length 1.8 cm without funneling.  A lower uterine segment contraction was noted during the exam.     Ultrasound 28 weeks 6 days:  S IUP, cephalic, heart rate 140, posterior placenta, CATALINA 10, BPP 8/8, umbilical artery Dopplers normal, MCA Dopplers attempted but could not be completed secondary to fetal position.  Transvaginal cervical length 9.2 mm.    Ultrasound 29 weeks 0 days:  S IUP, cephalic, heart rate 140, posterior placenta, CATALINA 10.4 cm, BPP 8/8.  Umbilical artery Dopplers normal.  MCA Dopplers normal.  CPR PI >1.  Transvaginal cervical length 6.9-9.1 mm.        LABS:    Recent Labs     07/16/25  1320   WBC 8.7   RBC 3.26*   HGB 9.6*  PENDING   HCT 29.9*   MCV 91.7   MCH 29.4   MCHC 32.1   RDW 17.3*      MPV 10.2       Recent Labs     07/16/25  1320      K 3.8      CO2 22   BUN 6   CREATININE 0.6   GLUCOSE 80   CALCIUM 9.3             Admission on 07/12/2025   Component Date Value Ref Range Status    WBC 07/12/2025 4.2 (L)  4.5 - 11.5 k/uL Final    RBC 07/12/2025 2.65 (L)  3.50 - 5.50 m/uL Final    Hemoglobin 07/12/2025 7.6 (L)     11.  History of recurrent pregnancy loss -- The patient's obstetric history was reviewed and notable for recurrent pregnancy loss.       Counseling was previously provided to the patient.      The chance of having a successful pregnancy is estimated to be >70% even with a history of two or more prior losses.  The overall birth rate after an abnormal diagnotic evaluation for recurrent loss is 71% and the birth rate is 77% in the setting of unexplained recurrent pregnancy loss.  Second trimester pregnancy loss is a significant risk factor for recurrent second trimester loss,  delivery (39%), and fetal/ demise (5-6%).       Patient's with a history of recurrent pregnancy loss may also be at increased risk for obstetrical complications including poor fetal growth,  delivery, and  section.  Thus, increased fetal surveillance is recommended with serial fetal growth monitoring every 3-4 weeks starting at 24-26 weeks' gestation.  She should monitor fetal kick counts daily starting at 28 weeks' gestation.  Additional surveillance may be indicated if any additional complications arise.     Additional maternal evaluation was recommended with a HgbA1C, thyroid function studies/thyroid peroxidase antibody, and antiphospholipid antibody screening.    --Testing ordered today     A maternal and paternal karyotype should be considered with any future losses.     And expanded carrier panel should be considered for both the patient and her partner.     A low dose aspirin was recommended to reduce her risk for recurrence.  Risks and benefits of low dose aspirin in pregnancy were reviewed.  She can take 81 mg daily.  She should continue this for the remainder of her pregnancy and for 6 to 8 weeks postpartum.      12.  History of low vitamin D -- The patient's vitamin D was low at 20 on 2019  --Recommend vitamin D3 2000 IU daily  --Monitor levels serially  --Monitor nutrition panel q4-6 weeks (CBC,

## 2025-07-18 NOTE — PROGRESS NOTES
RN at bedside, patient resting in bed. AM meds given, patient will call out after breakfast for morning monitoring. Call light within reach. VSS.

## 2025-07-18 NOTE — PROGRESS NOTES
[facility] Follow-Up Consultation/Antepartum Note    S:  The patient reports feeling well.  She notes fetal movement.  She denies any leaking of fluid or vaginal bleeding.  She reports having occasional contractions.  She denies having any signs or symptoms of preeclampsia.      O:   Patient Vitals for the past 24 hrs:   BP Temp Temp src Pulse Resp SpO2   07/18/25 0830 (!) 115/56 97.7 °F (36.5 °C) Oral 72 15 99 %   07/17/25 2009 (!) 105/55 98 °F (36.7 °C) Oral 81 16 --        Gen NAD  CV RRR  Lungs CTA B  Abd Gravid/soft/NTTP/NABS  Ext no edema BLE, no calf TTP BLE, +1 patellar reflexes BLE, no clonus BLE    Ultrasound 28 wk 5 days:   A single intrauterine gestation was seen in cephalic presentation with a heart rate of 158 bpm.  Placenta is posterior.  CATALINA 10.4 cm.  BPP 8/8.  Umbilical artery Dopplers normal.  MCA Dopplers normal.  CPR PI normal.  Transvaginal cervical length 1.8 cm without funneling.  A lower uterine segment contraction was noted during the exam.    Ultrasound 28 weeks 6 days:  S IUP, cephalic, heart rate 140, posterior placenta, CATALINA 10, BPP 8/8, umbilical artery Dopplers normal, MCA Dopplers attempted but could not be completed secondary to fetal position.  Transvaginal cervical length 9.2 mm.        LABS:    Recent Labs     07/16/25  1320   WBC 8.7   RBC 3.26*   HGB 9.6*  PENDING   HCT 29.9*   MCV 91.7   MCH 29.4   MCHC 32.1   RDW 17.3*      MPV 10.2       Recent Labs     07/16/25  1320      K 3.8      CO2 22   BUN 6   CREATININE 0.6   GLUCOSE 80   CALCIUM 9.3           Admission on 07/12/2025   Component Date Value Ref Range Status    WBC 07/12/2025 4.2 (L)  4.5 - 11.5 k/uL Final    RBC 07/12/2025 2.65 (L)  3.50 - 5.50 m/uL Final    Hemoglobin 07/12/2025 7.6 (L)  11.5 - 15.5 g/dL Final    Hematocrit 07/12/2025 24.7 (L)  34.0 - 48.0 % Final    MCV 07/12/2025 93.2  80.0 - 99.9 fL Final    MCH 07/12/2025 28.7  26.0 - 35.0 pg Final    MCHC 07/12/2025 30.8 (L)  32.0 - 34.5 g/dL Final     0.10 - 0.95 k/uL Final    Eosinophils Absolute 07/16/2025 0.11  0.05 - 0.50 k/uL Final    Basophils Absolute 07/16/2025 0.02  0.00 - 0.20 k/uL Final    Immature Granulocytes Absolute 07/16/2025 0.04  0.00 - 0.58 k/uL Final    Sodium 07/16/2025 138  136 - 145 mmol/L Final    Potassium 07/16/2025 3.8  3.5 - 5.1 mmol/L Final    Chloride 07/16/2025 104  98 - 107 mmol/L Final    CO2 07/16/2025 22  22 - 29 mmol/L Final    Anion Gap 07/16/2025 13  7 - 16 mmol/L Final    Glucose 07/16/2025 80  74 - 99 mg/dL Final    BUN 07/16/2025 6  6 - 20 mg/dL Final    Creatinine 07/16/2025 0.6  0.5 - 1.0 mg/dL Final    Est, Glom Filt Rate 07/16/2025 >90  >60 mL/min/1.73m2 Final    Calcium 07/16/2025 9.3  8.6 - 10.0 mg/dL Final    Total Protein 07/16/2025 7.4  6.4 - 8.3 g/dL Final    Albumin 07/16/2025 4.0  3.5 - 5.2 g/dL Final    Total Bilirubin 07/16/2025 <0.2  0.0 - 1.2 mg/dL Final    Alkaline Phosphatase 07/16/2025 92  35 - 104 U/L Final    ALT 07/16/2025 13  0 - 35 U/L Final    AST 07/16/2025 15  0 - 35 U/L Final    Ferritin 07/16/2025 85  ng/mL Final    Folate 07/16/2025 8.7  4.6 - 34.8 ng/mL Final    Vitamin B-12 07/16/2025 446  232 - 1245 pg/mL Final    Vit D, 25-Hydroxy 07/16/2025 45.7  30.0 - 100.0 ng/mL Final    Magnesium 07/16/2025 1.6  1.6 - 2.6 mg/dL Final    Hemoglobin A1C 07/16/2025 5.5  4.0 - 5.6 % Final    TSH 07/16/2025 1.51  0.27 - 4.20 uIU/mL Final    T4 Free 07/16/2025 0.9  0.9 - 1.7 ng/dL Final    Thyroid Peroxidase (Tpo) Ab 07/16/2025 9.0  0.0 - 34.0 IU/mL Final    LD 07/16/2025 170  135 - 214 U/L Final    Uric Acid 07/16/2025 2.6  2.4 - 5.7 mg/dL Final    Total Protein, Urine 07/16/2025 10  0 - 12 mg/dL Final    Creatinine, Ur 07/16/2025 82.6  29.0 - 226.0 mg/dL Final    Urine Total Protein Creatinine Rat* 07/16/2025 0.12  0.0 - 0.2 Final    Iron 07/16/2025 68  37 - 145 ug/dL Final    TIBC 07/16/2025 471 (H)  250 - 450 ug/dL Final    Iron % Saturation 07/16/2025 14 (L)  15 - 50 % Final    Color, UA 07/16/2025

## 2025-07-19 ENCOUNTER — ANCILLARY PROCEDURE (OUTPATIENT)
Age: 35
DRG: 566 | End: 2025-07-19
Payer: COMMERCIAL

## 2025-07-19 PROCEDURE — 76821 MIDDLE CEREBRAL ARTERY ECHO: CPT | Performed by: OBSTETRICS & GYNECOLOGY

## 2025-07-19 PROCEDURE — 6370000000 HC RX 637 (ALT 250 FOR IP): Performed by: OBSTETRICS & GYNECOLOGY

## 2025-07-19 PROCEDURE — 2580000003 HC RX 258: Performed by: OBSTETRICS & GYNECOLOGY

## 2025-07-19 PROCEDURE — 2500000003 HC RX 250 WO HCPCS: Performed by: OBSTETRICS & GYNECOLOGY

## 2025-07-19 PROCEDURE — 6360000002 HC RX W HCPCS: Performed by: OBSTETRICS & GYNECOLOGY

## 2025-07-19 PROCEDURE — 76819 FETAL BIOPHYS PROFIL W/O NST: CPT | Performed by: OBSTETRICS & GYNECOLOGY

## 2025-07-19 PROCEDURE — 76820 UMBILICAL ARTERY ECHO: CPT | Performed by: OBSTETRICS & GYNECOLOGY

## 2025-07-19 PROCEDURE — 76815 OB US LIMITED FETUS(S): CPT | Performed by: OBSTETRICS & GYNECOLOGY

## 2025-07-19 PROCEDURE — 1220000000 HC SEMI PRIVATE OB R&B

## 2025-07-19 PROCEDURE — 99232 SBSQ HOSP IP/OBS MODERATE 35: CPT | Performed by: OBSTETRICS & GYNECOLOGY

## 2025-07-19 RX ADMIN — PRENATAL WITH FERROUS FUM AND FOLIC ACID 1 TABLET: 3080; 920; 120; 400; 22; 1.84; 3; 20; 10; 1; 12; 200; 27; 25; 2 TABLET ORAL at 09:16

## 2025-07-19 RX ADMIN — FOLIC ACID 1 MG: 1 TABLET ORAL at 09:16

## 2025-07-19 RX ADMIN — ASPIRIN 81 MG: 81 TABLET, CHEWABLE ORAL at 09:16

## 2025-07-19 RX ADMIN — IRON SUCROSE 200 MG: 20 INJECTION, SOLUTION INTRAVENOUS at 16:26

## 2025-07-19 RX ADMIN — SODIUM CHLORIDE, PRESERVATIVE FREE 10 ML: 5 INJECTION INTRAVENOUS at 09:18

## 2025-07-19 RX ADMIN — SODIUM CHLORIDE, PRESERVATIVE FREE 10 ML: 5 INJECTION INTRAVENOUS at 17:00

## 2025-07-19 RX ADMIN — Medication 2000 UNITS: at 09:16

## 2025-07-19 NOTE — PROGRESS NOTES
[facility] Follow-Up Consultation/Antepartum Note    S:  The patient reports feeling well.  She notes fetal movement.  She denies any leaking of fluid or vaginal bleeding.  She reports having occasional contractions.  She denies having any signs or symptoms of preeclampsia.     O:   Patient Vitals for the past 24 hrs:   BP Temp Temp src Pulse Resp   07/19/25 0915 109/63 97.5 °F (36.4 °C) Oral 75 16   07/18/25 2031 (!) 106/56 98.2 °F (36.8 °C) Oral 79 16   07/18/25 1429 (!) 121/56 98 °F (36.7 °C) Oral 84 15        Gen NAD  CV RRR  Lungs CTA B  Abd Gravid/soft/NTTP/NABS  Ext no edema BLE, no calf TTP BLE, +1 patellar reflexes BLE, no clonus BLE    Ultrasound 28 wk 5 days:   A single intrauterine gestation was seen in cephalic presentation with a heart rate of 158 bpm.  Placenta is posterior.  CATALINA 10.4 cm.  BPP 8/8.  Umbilical artery Dopplers normal.  MCA Dopplers normal.  CPR PI normal.  Transvaginal cervical length 1.8 cm without funneling.  A lower uterine segment contraction was noted during the exam.     Ultrasound 28 weeks 6 days:  S IUP, cephalic, heart rate 140, posterior placenta, CATALINA 10, BPP 8/8, umbilical artery Dopplers normal, MCA Dopplers attempted but could not be completed secondary to fetal position.  Transvaginal cervical length 9.2 mm.     Ultrasound 29 weeks 0 days:  S IUP, cephalic, heart rate 140, posterior placenta, CATALINA 10.4 cm, BPP 8/8.  Umbilical artery Dopplers normal.  MCA Dopplers normal.  CPR PI >1.  Transvaginal cervical length 6.9-9.1 mm.    Ultrasound 29 weeks 1 day:  S IUP, cephalic, heart rate 133, posterior placenta, CATALINA 9 cm, BPP 8/8, Doppler studies normal, CPR PI >1.        LABS:        Admission on 07/12/2025   Component Date Value Ref Range Status    WBC 07/12/2025 4.2 (L)  4.5 - 11.5 k/uL Final    RBC 07/12/2025 2.65 (L)  3.50 - 5.50 m/uL Final    Hemoglobin 07/12/2025 7.6 (L)  11.5 - 15.5 g/dL Final    Hematocrit 07/12/2025 24.7 (L)  34.0 - 48.0 % Final    MCV 07/12/2025 93.2  80.0 -  completed on .  Results are above.  Recommendations below.    Supplement as needed           7.  NENA-1, heterozygote (4G/5G) -- The patient's thrombophilia panel 3/1/2021 was reviewed.  Testing was done secondary to recurrent pregnancy loss.  Results included: Factor V Leiden negative, prothrombin 2 gene mutation negative, MTHFR negative, NENA-1 5G/4G, factor XIII variant heterozygote,DRVVT negative, anticardiolipin IgM indeterminate at 13 (-0-12, indeterminate 13-19, low +20-80).     Counseling was previously provided.     Plasminogen activator inhibitor type I is the primary inhibitor of tissue plasminogen activator in plasma.  Mutation of the normal 5G sequence to 4G in the promoter region of the NENA-1 gene results in elevated plasma levels of NENA-1 and decreased fibrinolysis.  Individuals with 4G/4G genotypes have a higher risk for venous and arterial thrombosis.  In pregnant women, 4G homozygosity has been associated with an increased risk for fetal loss, poor fetal growth, hypertensive disorders of pregnancy, and  delivery.  Although the studies have some limitations.  Individuals who are heterozygous for the 4G variant have an increase in the level of the NENA-1 in plasma, relative to that associated with the normal 5G variant.  Increased plasma NENA-1 activity may increase the risk for venous thrombosis and myocardial infarction, especially in the presence of other thrombotic risk factors.     The prevalence of the NENA-1 polymorphism (4G/5G) is very high, approximately 50%, and the general population.  Additionally, approximately 20-25% of these individuals are homozygous for the 4G/4G genotype.     Recommendations:  Daily low-dose aspirin  Increased fetal surveillance with serial growth monitoring and twice-weekly testing in the third trimester  Delivery at/by 39 weeks gestation  Completion of thrombophilia panel (protein C, protein S, Antithrombin III, antiphospholipid antibody screening)        8.

## 2025-07-20 ENCOUNTER — ANCILLARY PROCEDURE (OUTPATIENT)
Age: 35
DRG: 566 | End: 2025-07-20
Payer: COMMERCIAL

## 2025-07-20 LAB — PROT S FREE AG ACT/NOR PPP IA: 39 % (ref 55–123)

## 2025-07-20 PROCEDURE — 1220000000 HC SEMI PRIVATE OB R&B

## 2025-07-20 PROCEDURE — 76821 MIDDLE CEREBRAL ARTERY ECHO: CPT | Performed by: OBSTETRICS & GYNECOLOGY

## 2025-07-20 PROCEDURE — 76820 UMBILICAL ARTERY ECHO: CPT | Performed by: OBSTETRICS & GYNECOLOGY

## 2025-07-20 PROCEDURE — 76815 OB US LIMITED FETUS(S): CPT | Performed by: OBSTETRICS & GYNECOLOGY

## 2025-07-20 PROCEDURE — 76819 FETAL BIOPHYS PROFIL W/O NST: CPT | Performed by: OBSTETRICS & GYNECOLOGY

## 2025-07-20 PROCEDURE — 99232 SBSQ HOSP IP/OBS MODERATE 35: CPT | Performed by: OBSTETRICS & GYNECOLOGY

## 2025-07-20 PROCEDURE — 6370000000 HC RX 637 (ALT 250 FOR IP): Performed by: OBSTETRICS & GYNECOLOGY

## 2025-07-20 PROCEDURE — 2500000003 HC RX 250 WO HCPCS: Performed by: OBSTETRICS & GYNECOLOGY

## 2025-07-20 RX ADMIN — ASPIRIN 81 MG: 81 TABLET, CHEWABLE ORAL at 11:00

## 2025-07-20 RX ADMIN — SODIUM CHLORIDE, PRESERVATIVE FREE 7 ML: 5 INJECTION INTRAVENOUS at 21:00

## 2025-07-20 RX ADMIN — SODIUM CHLORIDE, PRESERVATIVE FREE 10 ML: 5 INJECTION INTRAVENOUS at 11:00

## 2025-07-20 RX ADMIN — Medication 2000 UNITS: at 11:00

## 2025-07-20 RX ADMIN — PRENATAL WITH FERROUS FUM AND FOLIC ACID 1 TABLET: 3080; 920; 120; 400; 22; 1.84; 3; 20; 10; 1; 12; 200; 27; 25; 2 TABLET ORAL at 11:00

## 2025-07-20 RX ADMIN — FOLIC ACID 1 MG: 1 TABLET ORAL at 11:00

## 2025-07-21 ENCOUNTER — ANCILLARY PROCEDURE (OUTPATIENT)
Age: 35
DRG: 566 | End: 2025-07-21
Payer: COMMERCIAL

## 2025-07-21 PROCEDURE — 76821 MIDDLE CEREBRAL ARTERY ECHO: CPT | Performed by: OBSTETRICS & GYNECOLOGY

## 2025-07-21 PROCEDURE — 76815 OB US LIMITED FETUS(S): CPT | Performed by: OBSTETRICS & GYNECOLOGY

## 2025-07-21 PROCEDURE — 1220000000 HC SEMI PRIVATE OB R&B

## 2025-07-21 PROCEDURE — 2580000003 HC RX 258: Performed by: OBSTETRICS & GYNECOLOGY

## 2025-07-21 PROCEDURE — 2500000003 HC RX 250 WO HCPCS: Performed by: OBSTETRICS & GYNECOLOGY

## 2025-07-21 PROCEDURE — 76819 FETAL BIOPHYS PROFIL W/O NST: CPT | Performed by: OBSTETRICS & GYNECOLOGY

## 2025-07-21 PROCEDURE — 76820 UMBILICAL ARTERY ECHO: CPT | Performed by: OBSTETRICS & GYNECOLOGY

## 2025-07-21 PROCEDURE — 99232 SBSQ HOSP IP/OBS MODERATE 35: CPT | Performed by: OBSTETRICS & GYNECOLOGY

## 2025-07-21 PROCEDURE — 6360000002 HC RX W HCPCS: Performed by: OBSTETRICS & GYNECOLOGY

## 2025-07-21 PROCEDURE — 6370000000 HC RX 637 (ALT 250 FOR IP): Performed by: OBSTETRICS & GYNECOLOGY

## 2025-07-21 RX ADMIN — Medication 2000 UNITS: at 10:17

## 2025-07-21 RX ADMIN — IRON SUCROSE 200 MG: 20 INJECTION, SOLUTION INTRAVENOUS at 11:46

## 2025-07-21 RX ADMIN — PRENATAL WITH FERROUS FUM AND FOLIC ACID 1 TABLET: 3080; 920; 120; 400; 22; 1.84; 3; 20; 10; 1; 12; 200; 27; 25; 2 TABLET ORAL at 10:17

## 2025-07-21 RX ADMIN — ASPIRIN 81 MG: 81 TABLET, CHEWABLE ORAL at 10:17

## 2025-07-21 RX ADMIN — SODIUM CHLORIDE, PRESERVATIVE FREE 10 ML: 5 INJECTION INTRAVENOUS at 11:46

## 2025-07-21 RX ADMIN — FOLIC ACID 1 MG: 1 TABLET ORAL at 10:17

## 2025-07-21 NOTE — PROGRESS NOTES
[facility] Follow-Up Consultation/Antepartum Note    S:  The patient again reports feeling well.  She notes fetal movement.  She denies any leaking of fluid or vaginal bleeding.  She reports having occasional contractions.  She denies having any signs or symptoms of preeclampsia.       O:   Patient Vitals for the past 24 hrs:   BP Temp Temp src Pulse Resp   07/20/25 1929 102/64 98.2 °F (36.8 °C) Oral 83 16        Gen NAD  CV RRR  Lungs CTA B  Abd Gravid/soft/NTTP/NABS  Ext no edema BLE, no calf TTP BLE, +1 patellar reflexes BLE, no clonus BLE    Ultrasound 28 wk 5 days:   A single intrauterine gestation was seen in cephalic presentation with a heart rate of 158 bpm.  Placenta is posterior.  CATALINA 10.4 cm.  BPP 8/8.  Umbilical artery Dopplers normal.  MCA Dopplers normal.  CPR PI normal.  Transvaginal cervical length 1.8 cm without funneling.  A lower uterine segment contraction was noted during the exam.     Ultrasound 28 weeks 6 days:  SIUP, cephalic, heart rate 140, posterior placenta, CATALNIA 10, BPP 8/8, umbilical artery Dopplers normal, MCA Dopplers attempted but could not be completed secondary to fetal position.  Transvaginal cervical length 9.2 mm.     Ultrasound 29 weeks 0 days:  SIUP, cephalic, heart rate 140, posterior placenta, CATALINA 10.4 cm, BPP 8/8.  Umbilical artery Dopplers normal.  MCA Dopplers normal.  CPR PI >1.  Transvaginal cervical length 6.9-9.1 mm.     Ultrasound 29 weeks 1 day:  S IUP, cephalic, heart rate 133, posterior placenta, CATALINA 9 cm, BPP 8/8, Doppler studies normal, CPR PI >1.     Ultrasound 29 weeks 2 days:  SIUP, cephalic, heart rate 153, posterior placenta, CATALINA 9.9 cm, BPP 8/8.  Umbilical artery Dopplers normal.  MCA Dopplers normal.  CPR PI >1.    Ultrasound 29 weeks 3 days:  SIUP, cephalic, heart rate 136, posterior placenta, CATALINA 10.1 cm, BPP 8/8.  Doppler studies normal.  CPR PI normal.        LABS:          Admission on 07/12/2025   Component Date Value Ref Range Status    WBC 07/12/2025 4.2  ordered (patient received her first dose on 7/12)  --Monitor levels serially  --Monitor nutrition panel q4-6 weeks (CBC, CMP, magnesium, ferritin, folate, vitamin B12, vitamin D25OH), on/after 8/13  --Follow up with PCP for long term monitoring and management           15.  Routine OB -- Daily PNV  --GBS unknown     16.  Fetal well-being --  NST TID  --Continue growth scans q 3 wks. Last growth US: 28 weeks 1 day, estimated fetal weight 2 pounds 9 ounces, 50th percentile  --Fetal heart tones q shift.  -- Status post betamethasone 7/12-7/13  -- Candidate for rescue steroids on/after 7/26  -- Status post magnesium sulfate for neuroprotection 7/12-7/13     17.  DVT prophylaxis -- Continue LEROY hose            --The total time spent on today's visit was 35 minutes.  This included preparation for the consultation (i.e. reviewing prior external notes and test results), performance of a medically appropriate history and examination, counseling, orders for medications, tests or other procedures, and coordination of care.  Greater than 50% of the time was spent face-to-face with the patient and with counseling and coordination of care.  This time is exclusive of procedures performed.   I answered all of  the patient's questions to her satisfaction.      --If you have any questions regarding her management, please contact me at your convenience and thank you for allowing me to participate in her care.        Chen Loo MD, FACOG [facility]  [phone]        *All or parts of this note may have been generated using a voice recognition program. There may be typo, grammar, or Word substitution errors that have escaped my review of this note.

## 2025-07-22 ENCOUNTER — ANCILLARY PROCEDURE (OUTPATIENT)
Age: 35
DRG: 566 | End: 2025-07-22
Payer: COMMERCIAL

## 2025-07-22 LAB
CONFIRM APTT STACLOT: NORMAL S
DRVVT SCREEN TO CONFIRM RATIO: NORMAL {RATIO}
HEPARIN NT PPP QL: NORMAL
LA 3 SCREEN W REFLEX-IMP: NORMAL
LMW HEPARIN IND PLT AB SER QL: NORMAL
MIXING DRVVT: NORMAL S
NEUTRALIZED DRVVT SCREEN RATIO: NORMAL
PROTHROMBIN TIME: 13 S (ref 12–15.5)
SCREEN APTT: 0.81 S
SCREEN APTT: NORMAL S
SCREEN DRVVT: 0.8 S
THROMBIN TIME: NORMAL S

## 2025-07-22 PROCEDURE — 76815 OB US LIMITED FETUS(S): CPT | Performed by: OBSTETRICS & GYNECOLOGY

## 2025-07-22 PROCEDURE — 1220000000 HC SEMI PRIVATE OB R&B

## 2025-07-22 PROCEDURE — 76819 FETAL BIOPHYS PROFIL W/O NST: CPT | Performed by: OBSTETRICS & GYNECOLOGY

## 2025-07-22 PROCEDURE — 2500000003 HC RX 250 WO HCPCS: Performed by: OBSTETRICS & GYNECOLOGY

## 2025-07-22 PROCEDURE — 99231 SBSQ HOSP IP/OBS SF/LOW 25: CPT | Performed by: OBSTETRICS & GYNECOLOGY

## 2025-07-22 PROCEDURE — 76820 UMBILICAL ARTERY ECHO: CPT | Performed by: OBSTETRICS & GYNECOLOGY

## 2025-07-22 PROCEDURE — 76821 MIDDLE CEREBRAL ARTERY ECHO: CPT | Performed by: OBSTETRICS & GYNECOLOGY

## 2025-07-22 PROCEDURE — 6370000000 HC RX 637 (ALT 250 FOR IP): Performed by: OBSTETRICS & GYNECOLOGY

## 2025-07-22 RX ADMIN — Medication 2000 UNITS: at 10:40

## 2025-07-22 RX ADMIN — SODIUM CHLORIDE, PRESERVATIVE FREE 10 ML: 5 INJECTION INTRAVENOUS at 10:40

## 2025-07-22 RX ADMIN — PRENATAL WITH FERROUS FUM AND FOLIC ACID 1 TABLET: 3080; 920; 120; 400; 22; 1.84; 3; 20; 10; 1; 12; 200; 27; 25; 2 TABLET ORAL at 10:40

## 2025-07-22 RX ADMIN — ASPIRIN 81 MG: 81 TABLET, CHEWABLE ORAL at 10:40

## 2025-07-22 RX ADMIN — FOLIC ACID 1 MG: 1 TABLET ORAL at 10:40

## 2025-07-22 NOTE — PROGRESS NOTES
[facility] Follow-Up Consultation/Antepartum Note    S:  The patient again reports feeling well. She notes fetal movement. She denies any leaking of fluid or vaginal bleeding. She reports having occasional contractions. She denies having any signs or symptoms of preeclampsia.       O:   Patient Vitals for the past 24 hrs:   BP Temp Temp src Pulse Resp SpO2   07/22/25 1047 -- 98.3 °F (36.8 °C) -- -- -- --   07/21/25 2141 (!) 106/57 98.2 °F (36.8 °C) Oral 83 16 99 %        Gen NAD  CV RRR  Lungs CTA B  Abd Gravid/soft/NTTP/NABS  Ext no edema BLE, no calf TTP BLE, +1 patellar reflexes BLE, no clonus BLE    Ultrasound 28 wk 5 days:   A single intrauterine gestation was seen in cephalic presentation with a heart rate of 158 bpm.  Placenta is posterior.  CATALINA 10.4 cm.  BPP 8/8.  Umbilical artery Dopplers normal.  MCA Dopplers normal.  CPR PI normal.  Transvaginal cervical length 1.8 cm without funneling.  A lower uterine segment contraction was noted during the exam.     Ultrasound 28 weeks 6 days:  SIUP, cephalic, heart rate 140, posterior placenta, CATALINA 10, BPP 8/8, umbilical artery Dopplers normal, MCA Dopplers attempted but could not be completed secondary to fetal position.  Transvaginal cervical length 9.2 mm.     Ultrasound 29 weeks 0 days:  SIUP, cephalic, heart rate 140, posterior placenta, CATALINA 10.4 cm, BPP 8/8.  Umbilical artery Dopplers normal.  MCA Dopplers normal.  CPR PI >1.  Transvaginal cervical length 6.9-9.1 mm.     Ultrasound 29 weeks 1 day:  S IUP, cephalic, heart rate 133, posterior placenta, CATALINA 9 cm, BPP 8/8, Doppler studies normal, CPR PI >1.     Ultrasound 29 weeks 2 days:  SIUP, cephalic, heart rate 153, posterior placenta, CATALINA 9.9 cm, BPP 8/8.  Umbilical artery Dopplers normal.  MCA Dopplers normal.  CPR PI >1.     Ultrasound 29 weeks 3 days:  SIUP, cephalic, heart rate 136, posterior placenta, CATALINA 10.1 cm, BPP 8/8.  Doppler studies normal.  CPR PI normal.    Ultrasound 29 weeks 4 days:  SIUP, cephalic,  The risks and benefits of use were reviewed.    -- The patient is using progesterone suppositories, 200 mg twice daily.  -- Continue vaginal progesterone until 36-37 weeks gestation.     The patient was counseled to avoid heavy lifting, prolonged standing, and sexual intercourse.     Recommendations:  Continue inpatient care until least 30 weeks gestation in order to achieve a more favorable gestational age for the fetus, as previously recommended  Modified bedrest  Maternal lab assessments, completed on .  Results and recommendations are below  Continue vaginal progesterone until 36-37 weeks gestation follow-up   labor and PPROM precautions reviewed     Lab results from 2025 included: H/H 9.6/29.9, MCV 91.7, platelet count 245,000, potassium 3.8, creatinine 0.6, calcium 9.3, ALT 13, AST 15, magnesium 1.6, ferritin 85, iron saturation 14, folate 8.7, vitamin B12 4-46, vitamin D45.7, hemoglobin A1c 1 5.5%, TSH 1.51, free T40.9, TPO negative, antithyroglobulin antibody negative, , uric acid 2.6, peripheral smear normocytic anemia with mild polychromasia, reticulocyte count elevated, Lupus anticoagulant negative, anticardiolipin antibody negative, beta-2 glycoprotein antibody negative, protein S antigen free 39 (55-1 23, Antithrombin III activity 115%, hemoglobin electrophoresis normal, protein C activity >120, O+/antibody screen negative.  Urine culture mixed, urine protein creatinine ratio 0.12, urine analysis trace bacteria.  --Additional recommendations are below     3. History of LEEP/abnormal Pap smears -- The patient reports a history of abnormal Pap smears.  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     Counseling was previously provided.     She was counseled that data is conflicting as to whether a history of one prior LEEP places one at increased risk for  cervical shortening and/or  delivery.    -- Again, the patient

## 2025-07-23 ENCOUNTER — ANCILLARY PROCEDURE (OUTPATIENT)
Age: 35
DRG: 566 | End: 2025-07-23
Payer: COMMERCIAL

## 2025-07-23 PROBLEM — Z15.89 PAI-1 4G/5G GENOTYPE: Status: ACTIVE | Noted: 2025-07-23

## 2025-07-23 PROBLEM — Z87.51 HISTORY OF PRETERM DELIVERY: Status: ACTIVE | Noted: 2025-07-23

## 2025-07-23 PROBLEM — R76.0 ANTICARDIOLIPIN ANTIBODY POSITIVE: Status: ACTIVE | Noted: 2025-07-23

## 2025-07-23 PROCEDURE — 2500000003 HC RX 250 WO HCPCS: Performed by: OBSTETRICS & GYNECOLOGY

## 2025-07-23 PROCEDURE — 76821 MIDDLE CEREBRAL ARTERY ECHO: CPT | Performed by: OBSTETRICS & GYNECOLOGY

## 2025-07-23 PROCEDURE — 76819 FETAL BIOPHYS PROFIL W/O NST: CPT | Performed by: OBSTETRICS & GYNECOLOGY

## 2025-07-23 PROCEDURE — 6360000002 HC RX W HCPCS: Performed by: OBSTETRICS & GYNECOLOGY

## 2025-07-23 PROCEDURE — 76817 TRANSVAGINAL US OBSTETRIC: CPT | Performed by: OBSTETRICS & GYNECOLOGY

## 2025-07-23 PROCEDURE — 6370000000 HC RX 637 (ALT 250 FOR IP): Performed by: OBSTETRICS & GYNECOLOGY

## 2025-07-23 PROCEDURE — 76816 OB US FOLLOW-UP PER FETUS: CPT | Performed by: OBSTETRICS & GYNECOLOGY

## 2025-07-23 PROCEDURE — 2580000003 HC RX 258: Performed by: OBSTETRICS & GYNECOLOGY

## 2025-07-23 PROCEDURE — 1220000000 HC SEMI PRIVATE OB R&B

## 2025-07-23 PROCEDURE — 99231 SBSQ HOSP IP/OBS SF/LOW 25: CPT | Performed by: OBSTETRICS & GYNECOLOGY

## 2025-07-23 PROCEDURE — 76820 UMBILICAL ARTERY ECHO: CPT | Performed by: OBSTETRICS & GYNECOLOGY

## 2025-07-23 RX ADMIN — Medication 2000 UNITS: at 10:02

## 2025-07-23 RX ADMIN — FOLIC ACID 1 MG: 1 TABLET ORAL at 10:02

## 2025-07-23 RX ADMIN — ASPIRIN 81 MG: 81 TABLET, CHEWABLE ORAL at 10:02

## 2025-07-23 RX ADMIN — SODIUM CHLORIDE, PRESERVATIVE FREE 10 ML: 5 INJECTION INTRAVENOUS at 21:30

## 2025-07-23 RX ADMIN — PRENATAL WITH FERROUS FUM AND FOLIC ACID 1 TABLET: 3080; 920; 120; 400; 22; 1.84; 3; 20; 10; 1; 12; 200; 27; 25; 2 TABLET ORAL at 10:02

## 2025-07-23 RX ADMIN — IRON SUCROSE 200 MG: 20 INJECTION, SOLUTION INTRAVENOUS at 15:18

## 2025-07-23 NOTE — PROGRESS NOTES
Julio Gonzalez MD  [address] [facility] [place] [address]     RE:  MALINA CALVERT  : 1990   AGE: 34 y.o.    This report has been created using voice recognition software. It may contain errors which are inherent in voice recognition technology.    Dear Dr. Gonzalez:    I saw Malina Calvert today for the following indications:    Patient Active Problem List   Diagnosis    Vaginal discharge during pregnancy in third trimester    Short cervical length during pregnancy in third trimester    Normochromic normocytic anemia    Abnormal urinalysis    Vaginal bleeding during pregnancy, antepartum    History of cervical LEEP biopsy affecting care of mother, antepartum    Family history of autism.  Father to the baby's 2 nieces.    Short cervix affecting pregnancy     Malina Calvert is a 34 y.o. female, who is G6(1,1,3,2). She has an Estimated Date of Delivery: 10/3/2025 based on her established dates.  She is currently 29 weeks 5 days gestation based on that assessment.     The patient was initially admitted to the labor and delivery unit for evaluation and management secondary to complaint of vaginal bleeding.  She has no active vaginal bleeding at this time.  She had no complaint of abdominal pain or tenderness.  Her fetal movement has been good.  She stated that she recently started a new job requiring her to be standing for approximately 10 hours a day.  She had no history of abdominal or cervical trauma.      The patient has a history of having a cervical LEEP procedure performed on 4/10/2025.  Her most recent delivery was at 35 weeks 3 days gestational age on 2023.  The patient is at increased risk for  labor and delivery.    The patient understands that she is at increased risk for having a child with autism spectrum disorder because of the history of autism spectrum disorder in the father to the baby's 2 nieces, 1 of whom is nonverbal at age 5 years. She further understands that      IMPRESSION:    1.  IUP at 29 weeks 5 days Estimated Date of Delivery: 10/3/2025    2.  Cervical length was 10.1 mm on 2025    3.  Vaginal bleeding in pregnancy third trimester, resolved.    4.  Abnormal urinalysis.  Urine culture results showed mixed yolanda consistent with urogenital contamination..    5.  Negative wet prep.    6.  Normochromic, normocytic anemia    7.  Estimated fetal weight 1373 g, consistent with the 45th growth percentile on 2025    8.  Reassuring biophysical profile and cord Doppler testing 2025    9.  Previous  delivery at 35 weeks gestational age 2023  10.  No current recorded uterine contraction activity  11.  History of a cervical LEEP procedure performed on 4/10/2025.  12.  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  13.  Intermediate elevation of the anticardiolipin antibody IgM which was 13 on 3/1/2021  14.  O positive blood type  15.  Initial Celestone completed on 2025.  16.  Magnesium sulfate administered for fetal neuroprotection completed on 2025  17.  Normal MCA PSV and CPR PI on 2025  18.  Protein S antigen was decreased at 39 units.  This decrease may be related to pregnancy.    PLAN:  As we discussed at the time of the patient's assessment, 200 mg progesterone vaginal suppositories are to be administered, 1 each evening until 36 weeks gestational age, secondary to her short cervical length, unless there is a clinical indication to discontinue the medication prior to that time.    The patient was advised to remain sexually abstinent secondary to the short cervical length and recent episode of vaginal bleeding.    I recommended that she be off work for the balance of her pregnancy.    Further evaluation and management will be dependent on the results of the patient's testing and her clinical presentation.     I spent a total of 25 minutes with> 51% of the total time involved with completing the

## 2025-07-23 NOTE — PROGRESS NOTES
Assumed care of patient. Patient comfortable in bed, states she will call out when ready for her monitoring.

## 2025-07-24 ENCOUNTER — ANCILLARY PROCEDURE (OUTPATIENT)
Age: 35
DRG: 566 | End: 2025-07-24
Payer: COMMERCIAL

## 2025-07-24 PROBLEM — D64.9 ANEMIA: Status: ACTIVE | Noted: 2025-07-24

## 2025-07-24 PROBLEM — O26.23 HIGH RISK PREGNANCY DUE TO RECURRENT PREGNANCY LOSS IN THIRD TRIMESTER: Status: ACTIVE | Noted: 2025-07-24

## 2025-07-24 PROBLEM — E61.1 IRON DEFICIENCY: Status: ACTIVE | Noted: 2025-07-24

## 2025-07-24 PROBLEM — Z86.39 HISTORY OF VITAMIN D DEFICIENCY: Status: ACTIVE | Noted: 2025-07-24

## 2025-07-24 PROCEDURE — 76820 UMBILICAL ARTERY ECHO: CPT | Performed by: OBSTETRICS & GYNECOLOGY

## 2025-07-24 PROCEDURE — 2500000003 HC RX 250 WO HCPCS: Performed by: OBSTETRICS & GYNECOLOGY

## 2025-07-24 PROCEDURE — 1220000000 HC SEMI PRIVATE OB R&B

## 2025-07-24 PROCEDURE — 76819 FETAL BIOPHYS PROFIL W/O NST: CPT | Performed by: OBSTETRICS & GYNECOLOGY

## 2025-07-24 PROCEDURE — 76821 MIDDLE CEREBRAL ARTERY ECHO: CPT | Performed by: OBSTETRICS & GYNECOLOGY

## 2025-07-24 PROCEDURE — 6370000000 HC RX 637 (ALT 250 FOR IP): Performed by: OBSTETRICS & GYNECOLOGY

## 2025-07-24 PROCEDURE — 99231 SBSQ HOSP IP/OBS SF/LOW 25: CPT | Performed by: OBSTETRICS & GYNECOLOGY

## 2025-07-24 PROCEDURE — 76815 OB US LIMITED FETUS(S): CPT | Performed by: OBSTETRICS & GYNECOLOGY

## 2025-07-24 RX ADMIN — ASPIRIN 81 MG: 81 TABLET, CHEWABLE ORAL at 08:39

## 2025-07-24 RX ADMIN — FOLIC ACID 1 MG: 1 TABLET ORAL at 08:39

## 2025-07-24 RX ADMIN — SODIUM CHLORIDE, PRESERVATIVE FREE 10 ML: 5 INJECTION INTRAVENOUS at 08:39

## 2025-07-24 RX ADMIN — PRENATAL WITH FERROUS FUM AND FOLIC ACID 1 TABLET: 3080; 920; 120; 400; 22; 1.84; 3; 20; 10; 1; 12; 200; 27; 25; 2 TABLET ORAL at 08:39

## 2025-07-24 RX ADMIN — Medication 2000 UNITS: at 08:39

## 2025-07-24 NOTE — PROGRESS NOTES
Assumed patient care. Patient resting comfortably in bed. Patient perceives fetal movement. Denies ctx or VB. Patient states she will call out when ready for final monitoring.

## 2025-07-24 NOTE — PROGRESS NOTES
Low Risk Nutrition Note     Reason for Visit:   Length of Stay    Nutrition Assessment:  Pt adm Vaginal Discharge During Pregnancy in Third Trimester. . PMH Anemia. Pt sitting up eating Breakfast at time of visit. Reports tolerating Regular diet, good appetite, eating >75% meals, denies GI issues. No Malnutrition identified.     Current Nutrition Therapies:    ADULT DIET; Regular    Anthropometrics:   Current Height: 177.8 cm (5' 10\")  Current Weight - Scale: 98.7 kg (217 lb 9.5 oz)      Monitoring and Evaluation:  No nutrition diagnosis. Patient will be monitored per nutrition standards of care.     Consult Dietitian if nutrition intervention essential to patient care is needed.     Discharge Planning:  No needs    Lauren Cobian RD

## 2025-07-24 NOTE — PROGRESS NOTES
[facility] Follow-Up Consultation/Antepartum Note    S:  The patient again reports feeling well.  She notes fetal movement.  She denies any leaking of fluid or vaginal bleeding.  She reports having occasional contractions.  She denies having any signs or symptoms of preeclampsia.       O:   07/20/25 1929 98.2 °F (36.8 °C) Oral 83 Monitor 16 102/64 77 -- -- -- -- -- -- -- -- -- -- --   07/20/25 1127 98.4 °F (36.9 °C) Oral 91 Monitor 14 106/58 Important  74 -- Automatic Sitting -- -- -- -- -- -- -- --   07/19/25 2314 98.4 °F (36.9 °C) -- 73 Monitor 15 96/51 Important  66 -- -- -- -- -- -- -- -- -- -- --         Gen NAD  CV RRR  Lungs CTA B  Abd Gravid/soft/NTTP/NABS  Ext no edema BLE, no calf TTP BLE, +1 patellar reflexes BLE, no clonus BLE    Ultrasound 28 wk 5 days:   A single intrauterine gestation was seen in cephalic presentation with a heart rate of 158 bpm.  Placenta is posterior.  CATALINA 10.4 cm.  BPP 8/8.  Umbilical artery Dopplers normal.  MCA Dopplers normal.  CPR PI normal.  Transvaginal cervical length 1.8 cm without funneling.  A lower uterine segment contraction was noted during the exam.     Ultrasound 28 weeks 6 days:  S IUP, cephalic, heart rate 140, posterior placenta, CATALINA 10, BPP 8/8, umbilical artery Dopplers normal, MCA Dopplers attempted but could not be completed secondary to fetal position.  Transvaginal cervical length 9.2 mm.     Ultrasound 29 weeks 0 days:  S IUP, cephalic, heart rate 140, posterior placenta, CATALINA 10.4 cm, BPP 8/8.  Umbilical artery Dopplers normal.  MCA Dopplers normal.  CPR PI >1.  Transvaginal cervical length 6.9-9.1 mm.    Ultrasound 29 weeks 1 day:  S IUP, cephalic, heart rate 133, posterior placenta, CATALINA 9 cm, BPP 8/8, Doppler studies normal, CPR PI >1.    Ultrasound 29 weeks 2 days:  S IUP, cephalic, heart rate 153, posterior placenta, CATALINA 9.9 cm, BPP 8/8.  Umbilical artery Dopplers normal.  MCA Dopplers normal.  CPR PI >1.        LABS:        Admission on 07/12/2025    Component Date Value Ref Range Status    WBC 07/12/2025 4.2 (L)  4.5 - 11.5 k/uL Final    RBC 07/12/2025 2.65 (L)  3.50 - 5.50 m/uL Final    Hemoglobin 07/12/2025 7.6 (L)  11.5 - 15.5 g/dL Final    Hematocrit 07/12/2025 24.7 (L)  34.0 - 48.0 % Final    MCV 07/12/2025 93.2  80.0 - 99.9 fL Final    MCH 07/12/2025 28.7  26.0 - 35.0 pg Final    MCHC 07/12/2025 30.8 (L)  32.0 - 34.5 g/dL Final    RDW 07/12/2025 16.9 (H)  11.5 - 15.0 % Final    Platelets 07/12/2025 191  130 - 450 k/uL Final    MPV 07/12/2025 9.8  7.0 - 12.0 fL Final    Sodium 07/12/2025 137  136 - 145 mmol/L Final    Potassium 07/12/2025 3.6  3.5 - 5.1 mmol/L Final    Chloride 07/12/2025 106  98 - 107 mmol/L Final    CO2 07/12/2025 22  22 - 29 mmol/L Final    Anion Gap 07/12/2025 9  7 - 16 mmol/L Final    Glucose 07/12/2025 92  74 - 99 mg/dL Final    BUN 07/12/2025 5 (L)  6 - 20 mg/dL Final    Creatinine 07/12/2025 0.7  0.5 - 1.0 mg/dL Final    Est, Glom Filt Rate 07/12/2025 >90  >60 mL/min/1.73m2 Final    Calcium 07/12/2025 8.6  8.6 - 10.0 mg/dL Final    Total Protein 07/12/2025 6.2 (L)  6.4 - 8.3 g/dL Final    Albumin 07/12/2025 3.4 (L)  3.5 - 5.2 g/dL Final    Total Bilirubin 07/12/2025 <0.2  0.0 - 1.2 mg/dL Final    Alkaline Phosphatase 07/12/2025 74  35 - 104 U/L Final    ALT 07/12/2025 8  0 - 35 U/L Final    AST 07/12/2025 17  0 - 35 U/L Final    Color, UA 07/12/2025 Yellow  Yellow Final    Turbidity UA 07/12/2025 Clear  Clear Final    Glucose, Ur 07/12/2025 NEGATIVE  NEGATIVE mg/dL Final    Bilirubin, Urine 07/12/2025 NEGATIVE  NEGATIVE Final    Ketones, Urine 07/12/2025 NEGATIVE  NEGATIVE mg/dL Final    Specific Gravity, UA 07/12/2025 1.010  1.005 - 1.030 Final    Urine Hgb 07/12/2025 LARGE (A)  NEGATIVE Final    pH, Urine 07/12/2025 7.5  5.0 - 8.0 Final    Protein, UA 07/12/2025 NEGATIVE  NEGATIVE mg/dL Final    Urobilinogen, Urine 07/12/2025 0.2  0.0 - 1.0 EU/dL Final    Nitrite, Urine 07/12/2025 NEGATIVE  NEGATIVE Final    Leukocyte Esterase,

## 2025-07-24 NOTE — PROGRESS NOTES
Julio Gonzalez MD  [address] [facility] [place] [address]     RE:  MALINA CALVERT  : 1990   AGE: 34 y.o.    This report has been created using voice recognition software. It may contain errors which are inherent in voice recognition technology.    Dear Dr. Gonzalez:    I saw Malina Calvert today for the following indications:    Patient Active Problem List   Diagnosis    Vaginal discharge during pregnancy in third trimester    Short cervical length during pregnancy in third trimester    Normochromic normocytic anemia    Abnormal urinalysis    Vaginal bleeding during pregnancy, antepartum    History of cervical LEEP biopsy affecting care of mother, antepartum    Family history of autism.  Father to the baby's 2 nieces.    Short cervix affecting pregnancy    NENA-1 4G/5G genotype    History of  delivery    Anticardiolipin antibody positive    History of vitamin D deficiency    Iron deficiency    High risk pregnancy due to recurrent pregnancy loss in third trimester    Anemia     Malina Calvert is a 34 y.o. female, who is G6(1,1,3,2). She has an Estimated Date of Delivery: 10/3/2025 based on her established dates.  She is currently 29 weeks 6 days gestation based on that assessment.     The patient was initially admitted to the labor and delivery unit for evaluation and management secondary to complaint of vaginal bleeding.  She has no active vaginal bleeding at this time.  She had no complaint of abdominal pain or tenderness.  Her fetal movement has been good.  She stated that she recently started a new job requiring her to be standing for approximately 10 hours a day.  She had no history of abdominal or cervical trauma.     The patient has a history of having a cervical LEEP procedure performed on 4/10/2025.  Her most recent delivery was at 35 weeks 3 days gestational age on 2023.  The patient is at increased risk for  labor and delivery.    The patient understands that she is  MOM.  This value was not associated with fetal anemia or polycythemia.  The CPR PI was 1.51.  This value is not associated with compensatory centralization of fetal blood flow.                 GENETIC SCREENING/TERATOLOGY COUNSELING              (Includes patient, FTB, and any affected family members)    Patient Age > 35 Years NO   Thalassemia ( MVC<80) NO   Congential Heart Defect NO   Neural Tube Defect NO   Jason-Sachs NO   Sickle Cell Disease NO   Sickle Cell Trait NO   Sickle C Disease or Trait NO   Hemophilia NO   Muscular Dystrophy NO   Cystic Fibrosis NO   Charles Disease NO   Autism: FTB nieces.  YES   Mental Retardation NO   History of Fragile X NO   Maternal Diabetes NO   Other Genetic Disease or Syndrome NO   Previous Child With Congenital Abnormality Not Listed NO   Recreational Drugs NO                                                                 INFECTION HISTORY     HEPATITIS IMMUNIZED YES   HEPATITIS INFECTION NO   EXPOSURE TO TB NO   GENITAL HERPES NO   PARVOVIRUS B-19 NO   CHICKEN POX  NO   MEASLES NO   HIV NO     OB History    Para Term  AB Living   6 2 1 1 3 2   SAB IAB Ectopic Molar Multiple Live Births   3    0 2      # Outcome Date GA Lbr Toni/2nd Weight Sex Type Anes PTL Lv   6 Current            5  23 35w3d / 00:05 2.72 kg F Vag-Spont None Y GEORGE      Complications: Cord around body   4 Term 22 39w3d 09:10 / 00:12 3.19 kg F Vag-Spont None N GEORGE   3 SAB            2 SAB            1 SAB              PAST GYNECOLOGICAL  HISTORY:  Positive for abnormal pap smears.  History of high-grade VIANEY.  Negative for sexually transmitted diseases.   Positive for cervical LEEP   Negative for uterine surgery.   Negative for ovarian or tubal surgery.     Past Medical History:   Diagnosis Date    Abnormal Pap smear of cervix     colposcopy     Anemia        Past Surgical History:   Procedure Laterality Date    DILATION AND CURETTAGE OF UTERUS      DILATION AND CURETTAGE OF

## 2025-07-25 ENCOUNTER — ANCILLARY PROCEDURE (OUTPATIENT)
Age: 35
DRG: 566 | End: 2025-07-25
Payer: COMMERCIAL

## 2025-07-25 VITALS
DIASTOLIC BLOOD PRESSURE: 57 MMHG | WEIGHT: 217.59 LBS | OXYGEN SATURATION: 99 % | TEMPERATURE: 97.7 F | RESPIRATION RATE: 14 BRPM | HEIGHT: 70 IN | HEART RATE: 86 BPM | BODY MASS INDEX: 31.15 KG/M2 | SYSTOLIC BLOOD PRESSURE: 111 MMHG

## 2025-07-25 PROCEDURE — 76815 OB US LIMITED FETUS(S): CPT | Performed by: OBSTETRICS & GYNECOLOGY

## 2025-07-25 PROCEDURE — 99231 SBSQ HOSP IP/OBS SF/LOW 25: CPT | Performed by: OBSTETRICS & GYNECOLOGY

## 2025-07-25 PROCEDURE — 76820 UMBILICAL ARTERY ECHO: CPT | Performed by: OBSTETRICS & GYNECOLOGY

## 2025-07-25 PROCEDURE — 76817 TRANSVAGINAL US OBSTETRIC: CPT | Performed by: OBSTETRICS & GYNECOLOGY

## 2025-07-25 PROCEDURE — 76821 MIDDLE CEREBRAL ARTERY ECHO: CPT | Performed by: OBSTETRICS & GYNECOLOGY

## 2025-07-25 PROCEDURE — 76819 FETAL BIOPHYS PROFIL W/O NST: CPT | Performed by: OBSTETRICS & GYNECOLOGY

## 2025-07-25 PROCEDURE — 2500000003 HC RX 250 WO HCPCS: Performed by: OBSTETRICS & GYNECOLOGY

## 2025-07-25 PROCEDURE — 6370000000 HC RX 637 (ALT 250 FOR IP): Performed by: OBSTETRICS & GYNECOLOGY

## 2025-07-25 RX ADMIN — SODIUM CHLORIDE, PRESERVATIVE FREE 10 ML: 5 INJECTION INTRAVENOUS at 08:05

## 2025-07-25 RX ADMIN — FOLIC ACID 1 MG: 1 TABLET ORAL at 08:04

## 2025-07-25 RX ADMIN — ASPIRIN 81 MG: 81 TABLET, CHEWABLE ORAL at 08:04

## 2025-07-25 RX ADMIN — PRENATAL WITH FERROUS FUM AND FOLIC ACID 1 TABLET: 3080; 920; 120; 400; 22; 1.84; 3; 20; 10; 1; 12; 200; 27; 25; 2 TABLET ORAL at 08:04

## 2025-07-25 RX ADMIN — Medication 2000 UNITS: at 08:04

## 2025-07-25 NOTE — PROGRESS NOTES
Sitting up on couch in room, reports feeling comfortable at this time, denies VB or LOF and reports +FM. Medications administered as ordered. Ambulated to bathroom and will call out for VS and head to toe assessment.

## 2025-07-25 NOTE — DISCHARGE INSTRUCTIONS
Home Undelivered Discharge Instructions    After Discharge Orders:    No future appointments.    Call physician or midwife's office on *** for instructions.              Diet:  normal diet as tolerated    Rest: normal activity as tolerated, no sex, no douching, and no tub baths    Other instructions: Do kick counts once a day on your baby. Choose the time of day your baby is most active. Get in a comfortable lying or sitting position and time how long it takes to feel 10 kicks, twists, turns, swishes, or rolls. Call your physician or midwife if there have not been 10 kicks in 2 hours    Call physician or midwife, return to Labor and Delivery, call 911, or go to the nearest Emergency Room if: increased leakage or fluid, contractions more than  6 per  1 hour, decreased fetal movement, persistent low back pain or cramping, bleeding from vaginal area, difficulty urinating, pain with urination, difficulty breathing, new calf pain, persistent headache, or vision change

## 2025-07-25 NOTE — PROGRESS NOTES
Assisted patient in wheelchair to car, written and verbal discharge instructions provided. All questions answered per patient satisfaction.

## 2025-07-25 NOTE — PROGRESS NOTES
Dr. Ulrich at nurses station and stated patient may be discharged home. Called and spoke with Dr. Gonzalez in regards MFM recommendation. New order to be discharged home now and then follow up next week in office.

## 2025-07-25 NOTE — PROGRESS NOTES
Julio Gonzalez MD  [address] [place] [address]     RE:  MALINA CALVERT  : 1990   AGE: 34 y.o.    This report has been created using voice recognition software. It may contain errors which are inherent in voice recognition technology.    Dear Dr. Gonzalez:    I saw Malina Calvert today for the following indications:    Patient Active Problem List   Diagnosis    Vaginal discharge during pregnancy in third trimester    Short cervical length during pregnancy in third trimester    Normochromic normocytic anemia    Abnormal urinalysis    Vaginal bleeding during pregnancy, antepartum    History of cervical LEEP biopsy affecting care of mother, antepartum    Family history of autism.  Father to the baby's 2 nieces.    Short cervix affecting pregnancy    NENA-1 4G/5G genotype    History of  delivery    Anticardiolipin antibody positive    History of vitamin D deficiency    Iron deficiency    High risk pregnancy due to recurrent pregnancy loss in third trimester    Anemia     Malina Calvert is a 34 y.o. female, who is G6(1,1,3,2). She has an Estimated Date of Delivery: 10/3/2025 based on her established dates.  She is currently 30 weeks 0 days gestation based on that assessment.     The patient was initially admitted to the labor and delivery unit secondary to complaint of vaginal bleeding.  She has no active vaginal bleeding at this time.  She had no complaint of abdominal pain or tenderness.  Her fetal movement has been good.  She stated that she recently started a new job requiring her to be standing for approximately 10 hours a day.  She had no history of abdominal or cervical trauma.  The cervix was noted to be open.  The patient was admitted for further evaluation and management.    The patient has a history of having a cervical LEEP procedure performed on 4/10/2025.  Her most recent delivery was at 35 weeks 3 days gestational age on 2023.  The patient is at increased risk  07/16/2025 82.6  29.0 - 226.0 mg/dL Final    Urine Total Protein Creatinine Rat* 07/16/2025 0.12  0.0 - 0.2 Final    Iron 07/16/2025 68  37 - 145 ug/dL Final    TIBC 07/16/2025 471 (H)  250 - 450 ug/dL Final    Iron % Saturation 07/16/2025 14 (L)  15 - 50 % Final    Color, UA 07/16/2025 Yellow  Yellow Final    Turbidity UA 07/16/2025 Clear  Clear Final    Glucose, Ur 07/16/2025 NEGATIVE  NEGATIVE mg/dL Final    Bilirubin, Urine 07/16/2025 NEGATIVE  NEGATIVE Final    Ketones, Urine 07/16/2025 NEGATIVE  NEGATIVE mg/dL Final    Specific Gravity, UA 07/16/2025 1.010  1.005 - 1.030 Final    Urine Hgb 07/16/2025 TRACE (A)  NEGATIVE Final    pH, Urine 07/16/2025 6.0  5.0 - 8.0 Final    Protein, UA 07/16/2025 NEGATIVE  NEGATIVE mg/dL Final    Urobilinogen, Urine 07/16/2025 0.2  0.0 - 1.0 EU/dL Final    Nitrite, Urine 07/16/2025 NEGATIVE  NEGATIVE Final    Leukocyte Esterase, Urine 07/16/2025 SMALL (A)  NEGATIVE Final    WBC, UA 07/16/2025 6 TO 9 (A)  0 TO 5 /HPF Final    RBC, UA 07/16/2025 3 to 5 (A)  0 TO 2 /HPF Final    Epithelial Cells, UA 07/16/2025 10 TO 20  /HPF Final    Bacteria, UA 07/16/2025 TRACE (A)  None Final    Specimen Description 07/16/2025 .CLEAN CATCH URINE   Final    Special Requests 07/16/2025 Site: Clean Catch   Final    Culture 07/16/2025 Mixed yolanda isolated. Further workup and sensitivity testing not routinely indicated and will not be perfomed. Mixed yolanda isolated includes: (A)   Final    Culture 07/16/2025 MIXED GRAM POSITIVE ORGANISMS (A)   Final    Culture 07/16/2025 GRAM NEGATIVE RODS One colony (A)   Final    Pathologist Review 07/16/2025 (NOTE)   Final    Retic Ct Pct 07/16/2025 2.8 (H)  0.4 - 1.9 % Final    Absolute Retic # 07/16/2025 0.090  M/uL Final    Immature Retic Fract 07/16/2025 36.2 (H)  3.0 - 15.9 % Final    Retic Hemoglobin 07/16/2025 32.3  28.2 - 36.6 pg Final    PT D 07/16/2025 13.0  12.0 - 15.5 s Final    PTT D 07/16/2025 0.81  <=1.20 Final    Thrombin Time 07/16/2025 Not

## 2025-08-04 ENCOUNTER — HOSPITAL ENCOUNTER (OUTPATIENT)
Age: 35
Setting detail: OBSERVATION
Discharge: HOME | End: 2025-08-04
Attending: OBSTETRICS & GYNECOLOGY | Admitting: OBSTETRICS & GYNECOLOGY
Payer: COMMERCIAL

## 2025-08-04 VITALS
SYSTOLIC BLOOD PRESSURE: 111 MMHG | TEMPERATURE: 98.6 F | DIASTOLIC BLOOD PRESSURE: 56 MMHG | HEART RATE: 75 BPM | RESPIRATION RATE: 16 BRPM

## 2025-08-04 PROBLEM — O36.8190 DECREASED FETAL MOVEMENT AFFECTING MANAGEMENT OF MOTHER, ANTEPARTUM: Status: ACTIVE | Noted: 2025-08-04

## 2025-08-04 PROBLEM — Z3A.31 31 WEEKS GESTATION OF PREGNANCY: Status: ACTIVE | Noted: 2025-08-04

## 2025-08-04 PROCEDURE — G0378 HOSPITAL OBSERVATION PER HR: HCPCS

## 2025-08-19 ENCOUNTER — HOSPITAL ENCOUNTER (INPATIENT)
Age: 35
LOS: 3 days | Discharge: HOME | DRG: 560 | End: 2025-08-24
Attending: OBSTETRICS & GYNECOLOGY | Admitting: OBSTETRICS & GYNECOLOGY
Payer: COMMERCIAL

## 2025-08-19 DIAGNOSIS — O26.893 VAGINAL DISCHARGE DURING PREGNANCY IN THIRD TRIMESTER: ICD-10-CM

## 2025-08-19 DIAGNOSIS — O26.873 SHORT CERVICAL LENGTH DURING PREGNANCY IN THIRD TRIMESTER: ICD-10-CM

## 2025-08-19 DIAGNOSIS — O34.40 HISTORY OF CERVICAL LEEP BIOPSY AFFECTING CARE OF MOTHER, ANTEPARTUM: ICD-10-CM

## 2025-08-19 DIAGNOSIS — R76.0 ANTICARDIOLIPIN ANTIBODY POSITIVE: ICD-10-CM

## 2025-08-19 DIAGNOSIS — Z3A.33 33 WEEKS GESTATION OF PREGNANCY: Primary | ICD-10-CM

## 2025-08-19 DIAGNOSIS — O42.919 PRETERM PREMATURE RUPTURE OF MEMBRANES (PPROM) WITH UNKNOWN ONSET OF LABOR: ICD-10-CM

## 2025-08-19 DIAGNOSIS — O26.23 HIGH RISK PREGNANCY DUE TO RECURRENT PREGNANCY LOSS IN THIRD TRIMESTER: ICD-10-CM

## 2025-08-19 DIAGNOSIS — Z98.890 HISTORY OF CERVICAL LEEP BIOPSY AFFECTING CARE OF MOTHER, ANTEPARTUM: ICD-10-CM

## 2025-08-19 DIAGNOSIS — N89.8 VAGINAL DISCHARGE DURING PREGNANCY IN THIRD TRIMESTER: ICD-10-CM

## 2025-08-19 DIAGNOSIS — Z87.51 HISTORY OF PRETERM DELIVERY: ICD-10-CM

## 2025-08-19 LAB
ABO + RH BLD: NORMAL
AMNISURE, POC: POSITIVE
ARM BAND NUMBER: NORMAL
BLOOD BANK SAMPLE EXPIRATION: NORMAL
BLOOD GROUP ANTIBODIES SERPL: NEGATIVE
ERYTHROCYTE [DISTWIDTH] IN BLOOD BY AUTOMATED COUNT: 19.3 % (ref 11.5–15)
HCT VFR BLD AUTO: 30.5 % (ref 34–48)
HGB BLD-MCNC: 9.8 G/DL (ref 11.5–15.5)
Lab: NORMAL
MCH RBC QN AUTO: 30 PG (ref 26–35)
MCHC RBC AUTO-ENTMCNC: 32.1 G/DL (ref 32–34.5)
MCV RBC AUTO: 93.3 FL (ref 80–99.9)
NEGATIVE QC PASS/FAIL: NORMAL
PLATELET # BLD AUTO: 206 K/UL (ref 130–450)
PMV BLD AUTO: 11 FL (ref 7–12)
POSITIVE QC PASS/FAIL: NORMAL
RBC # BLD AUTO: 3.27 M/UL (ref 3.5–5.5)
WBC OTHER # BLD: 6.5 K/UL (ref 4.5–11.5)

## 2025-08-19 PROCEDURE — 96365 THER/PROPH/DIAG IV INF INIT: CPT

## 2025-08-19 PROCEDURE — 86901 BLOOD TYPING SEROLOGIC RH(D): CPT

## 2025-08-19 PROCEDURE — 2580000003 HC RX 258: Performed by: STUDENT IN AN ORGANIZED HEALTH CARE EDUCATION/TRAINING PROGRAM

## 2025-08-19 PROCEDURE — 99221 1ST HOSP IP/OBS SF/LOW 40: CPT | Performed by: STUDENT IN AN ORGANIZED HEALTH CARE EDUCATION/TRAINING PROGRAM

## 2025-08-19 PROCEDURE — 86850 RBC ANTIBODY SCREEN: CPT

## 2025-08-19 PROCEDURE — 96372 THER/PROPH/DIAG INJ SC/IM: CPT

## 2025-08-19 PROCEDURE — 85027 COMPLETE CBC AUTOMATED: CPT

## 2025-08-19 PROCEDURE — 6360000002 HC RX W HCPCS: Performed by: STUDENT IN AN ORGANIZED HEALTH CARE EDUCATION/TRAINING PROGRAM

## 2025-08-19 PROCEDURE — 86900 BLOOD TYPING SEROLOGIC ABO: CPT

## 2025-08-19 PROCEDURE — 6370000000 HC RX 637 (ALT 250 FOR IP): Performed by: STUDENT IN AN ORGANIZED HEALTH CARE EDUCATION/TRAINING PROGRAM

## 2025-08-19 RX ORDER — AZITHROMYCIN 250 MG/1
1000 TABLET, FILM COATED ORAL ONCE
Status: COMPLETED | OUTPATIENT
Start: 2025-08-19 | End: 2025-08-19

## 2025-08-19 RX ORDER — AMOXICILLIN 875 MG/1
875 TABLET, COATED ORAL EVERY 12 HOURS SCHEDULED
Status: DISCONTINUED | OUTPATIENT
Start: 2025-08-21 | End: 2025-08-24 | Stop reason: HOSPADM

## 2025-08-19 RX ORDER — BETAMETHASONE SODIUM PHOSPHATE AND BETAMETHASONE ACETATE 3; 3 MG/ML; MG/ML
12 INJECTION, SUSPENSION INTRA-ARTICULAR; INTRALESIONAL; INTRAMUSCULAR; SOFT TISSUE DAILY
Status: COMPLETED | OUTPATIENT
Start: 2025-08-19 | End: 2025-08-19

## 2025-08-19 RX ADMIN — AZITHROMYCIN 1000 MG: 250 TABLET, FILM COATED ORAL at 17:28

## 2025-08-19 RX ADMIN — AMPICILLIN INJECTION 2000 MG: 2 POWDER, FOR SOLUTION INTRAMUSCULAR; INTRAVENOUS at 18:09

## 2025-08-19 RX ADMIN — BETAMETHASONE SODIUM PHOSPHATE AND BETAMETHASONE ACETATE 12 MG: 3; 3 INJECTION, SUSPENSION INTRA-ARTICULAR; INTRALESIONAL; INTRAMUSCULAR at 17:29

## 2025-08-20 ENCOUNTER — ANCILLARY PROCEDURE (OUTPATIENT)
Age: 35
DRG: 560 | End: 2025-08-20
Payer: COMMERCIAL

## 2025-08-20 PROBLEM — O36.5930 POOR FETAL GROWTH AFFECTING MANAGEMENT OF MOTHER IN THIRD TRIMESTER: Status: ACTIVE | Noted: 2025-08-20

## 2025-08-20 PROBLEM — O42.919 PRETERM PREMATURE RUPTURE OF MEMBRANES (PPROM) WITH UNKNOWN ONSET OF LABOR: Status: ACTIVE | Noted: 2025-08-20

## 2025-08-20 PROBLEM — O41.03X0 OLIGOHYDRAMNIOS IN THIRD TRIMESTER: Status: ACTIVE | Noted: 2025-08-20

## 2025-08-20 PROCEDURE — 76816 OB US FOLLOW-UP PER FETUS: CPT | Performed by: OBSTETRICS & GYNECOLOGY

## 2025-08-20 PROCEDURE — 96376 TX/PRO/DX INJ SAME DRUG ADON: CPT

## 2025-08-20 PROCEDURE — 2580000003 HC RX 258: Performed by: STUDENT IN AN ORGANIZED HEALTH CARE EDUCATION/TRAINING PROGRAM

## 2025-08-20 PROCEDURE — 76819 FETAL BIOPHYS PROFIL W/O NST: CPT | Performed by: OBSTETRICS & GYNECOLOGY

## 2025-08-20 PROCEDURE — G0378 HOSPITAL OBSERVATION PER HR: HCPCS

## 2025-08-20 PROCEDURE — 6360000002 HC RX W HCPCS: Performed by: STUDENT IN AN ORGANIZED HEALTH CARE EDUCATION/TRAINING PROGRAM

## 2025-08-20 PROCEDURE — 99254 IP/OBS CNSLTJ NEW/EST MOD 60: CPT | Performed by: OBSTETRICS & GYNECOLOGY

## 2025-08-20 PROCEDURE — 76821 MIDDLE CEREBRAL ARTERY ECHO: CPT | Performed by: OBSTETRICS & GYNECOLOGY

## 2025-08-20 PROCEDURE — 76820 UMBILICAL ARTERY ECHO: CPT | Performed by: OBSTETRICS & GYNECOLOGY

## 2025-08-20 PROCEDURE — 96366 THER/PROPH/DIAG IV INF ADDON: CPT

## 2025-08-20 RX ORDER — AZITHROMYCIN 250 MG/1
250 TABLET, FILM COATED ORAL DAILY
Status: DISCONTINUED | OUTPATIENT
Start: 2025-08-20 | End: 2025-08-22 | Stop reason: ALTCHOICE

## 2025-08-20 RX ADMIN — AMPICILLIN INJECTION 2000 MG: 2 POWDER, FOR SOLUTION INTRAMUSCULAR; INTRAVENOUS at 15:23

## 2025-08-20 RX ADMIN — AMPICILLIN INJECTION 2000 MG: 2 POWDER, FOR SOLUTION INTRAMUSCULAR; INTRAVENOUS at 00:06

## 2025-08-20 RX ADMIN — AMPICILLIN INJECTION 2000 MG: 2 POWDER, FOR SOLUTION INTRAMUSCULAR; INTRAVENOUS at 22:49

## 2025-08-20 RX ADMIN — AMPICILLIN INJECTION 2000 MG: 2 POWDER, FOR SOLUTION INTRAMUSCULAR; INTRAVENOUS at 06:18

## 2025-08-21 ENCOUNTER — ANCILLARY PROCEDURE (OUTPATIENT)
Age: 35
DRG: 560 | End: 2025-08-21
Payer: COMMERCIAL

## 2025-08-21 LAB
AMPHET UR QL SCN: NEGATIVE
BARBITURATES UR QL SCN: NEGATIVE
BENZODIAZ UR QL: NEGATIVE
BILIRUB UR QL STRIP: NEGATIVE
BUPRENORPHINE UR QL: NEGATIVE
CANNABINOIDS UR QL SCN: NEGATIVE
CLARITY UR: CLEAR
COCAINE UR QL SCN: NEGATIVE
COLOR UR: YELLOW
EPI CELLS #/AREA URNS HPF: ABNORMAL /HPF
FENTANYL UR QL: NEGATIVE
GLUCOSE UR STRIP-MCNC: NEGATIVE MG/DL
HGB UR QL STRIP.AUTO: NEGATIVE
KETONES UR STRIP-MCNC: NEGATIVE MG/DL
LEUKOCYTE ESTERASE UR QL STRIP: NEGATIVE
METHADONE UR QL: NEGATIVE
NITRITE UR QL STRIP: NEGATIVE
OPIATES UR QL SCN: NEGATIVE
OXYCODONE UR QL SCN: NEGATIVE
PCP UR QL SCN: NEGATIVE
PH UR STRIP: 7 [PH] (ref 5–8)
PROT UR STRIP-MCNC: NEGATIVE MG/DL
RBC #/AREA URNS HPF: ABNORMAL /HPF
SP GR UR STRIP: <1.005 (ref 1–1.03)
TEST INFORMATION: NORMAL
UROBILINOGEN UR STRIP-ACNC: 0.2 EU/DL (ref 0–1)
WBC #/AREA URNS HPF: ABNORMAL /HPF

## 2025-08-21 PROCEDURE — G0378 HOSPITAL OBSERVATION PER HR: HCPCS

## 2025-08-21 PROCEDURE — 2580000003 HC RX 258: Performed by: STUDENT IN AN ORGANIZED HEALTH CARE EDUCATION/TRAINING PROGRAM

## 2025-08-21 PROCEDURE — 87086 URINE CULTURE/COLONY COUNT: CPT

## 2025-08-21 PROCEDURE — 3E033VJ INTRODUCTION OF OTHER HORMONE INTO PERIPHERAL VEIN, PERCUTANEOUS APPROACH: ICD-10-PCS | Performed by: OBSTETRICS & GYNECOLOGY

## 2025-08-21 PROCEDURE — 81001 URINALYSIS AUTO W/SCOPE: CPT

## 2025-08-21 PROCEDURE — 76815 OB US LIMITED FETUS(S): CPT | Performed by: OBSTETRICS & GYNECOLOGY

## 2025-08-21 PROCEDURE — 1220000001 HC SEMI PRIVATE L&D R&B

## 2025-08-21 PROCEDURE — 6370000000 HC RX 637 (ALT 250 FOR IP): Performed by: OBSTETRICS & GYNECOLOGY

## 2025-08-21 PROCEDURE — 76819 FETAL BIOPHYS PROFIL W/O NST: CPT | Performed by: OBSTETRICS & GYNECOLOGY

## 2025-08-21 PROCEDURE — 3E0P7VZ INTRODUCTION OF HORMONE INTO FEMALE REPRODUCTIVE, VIA NATURAL OR ARTIFICIAL OPENING: ICD-10-PCS | Performed by: OBSTETRICS & GYNECOLOGY

## 2025-08-21 PROCEDURE — 96366 THER/PROPH/DIAG IV INF ADDON: CPT

## 2025-08-21 PROCEDURE — 6360000002 HC RX W HCPCS: Performed by: STUDENT IN AN ORGANIZED HEALTH CARE EDUCATION/TRAINING PROGRAM

## 2025-08-21 PROCEDURE — 99232 SBSQ HOSP IP/OBS MODERATE 35: CPT | Performed by: OBSTETRICS & GYNECOLOGY

## 2025-08-21 PROCEDURE — 80307 DRUG TEST PRSMV CHEM ANLYZR: CPT

## 2025-08-21 PROCEDURE — 6370000000 HC RX 637 (ALT 250 FOR IP): Performed by: STUDENT IN AN ORGANIZED HEALTH CARE EDUCATION/TRAINING PROGRAM

## 2025-08-21 PROCEDURE — 76820 UMBILICAL ARTERY ECHO: CPT | Performed by: OBSTETRICS & GYNECOLOGY

## 2025-08-21 RX ORDER — LOPERAMIDE HYDROCHLORIDE 2 MG/1
2 CAPSULE ORAL PRN
Status: DISCONTINUED | OUTPATIENT
Start: 2025-08-21 | End: 2025-08-24 | Stop reason: HOSPADM

## 2025-08-21 RX ORDER — SODIUM CHLORIDE, SODIUM LACTATE, POTASSIUM CHLORIDE, AND CALCIUM CHLORIDE .6; .31; .03; .02 G/100ML; G/100ML; G/100ML; G/100ML
500 INJECTION, SOLUTION INTRAVENOUS PRN
Status: DISCONTINUED | OUTPATIENT
Start: 2025-08-21 | End: 2025-08-24 | Stop reason: HOSPADM

## 2025-08-21 RX ORDER — SODIUM CHLORIDE, SODIUM LACTATE, POTASSIUM CHLORIDE, CALCIUM CHLORIDE 600; 310; 30; 20 MG/100ML; MG/100ML; MG/100ML; MG/100ML
INJECTION, SOLUTION INTRAVENOUS CONTINUOUS
Status: DISCONTINUED | OUTPATIENT
Start: 2025-08-22 | End: 2025-08-24 | Stop reason: HOSPADM

## 2025-08-21 RX ORDER — MISOPROSTOL 200 UG/1
400 TABLET ORAL PRN
Status: DISCONTINUED | OUTPATIENT
Start: 2025-08-21 | End: 2025-08-24 | Stop reason: HOSPADM

## 2025-08-21 RX ORDER — TRANEXAMIC ACID 10 MG/ML
1000 INJECTION, SOLUTION INTRAVENOUS
Status: DISCONTINUED | OUTPATIENT
Start: 2025-08-21 | End: 2025-08-24 | Stop reason: HOSPADM

## 2025-08-21 RX ORDER — ONDANSETRON 4 MG/1
4 TABLET, ORALLY DISINTEGRATING ORAL EVERY 8 HOURS PRN
Status: DISCONTINUED | OUTPATIENT
Start: 2025-08-21 | End: 2025-08-24 | Stop reason: HOSPADM

## 2025-08-21 RX ORDER — METHYLERGONOVINE MALEATE 0.2 MG/ML
200 INJECTION INTRAVENOUS PRN
Status: DISCONTINUED | OUTPATIENT
Start: 2025-08-21 | End: 2025-08-24 | Stop reason: HOSPADM

## 2025-08-21 RX ORDER — TERBUTALINE SULFATE 1 MG/ML
0.25 INJECTION SUBCUTANEOUS
Status: DISCONTINUED | OUTPATIENT
Start: 2025-08-21 | End: 2025-08-24 | Stop reason: HOSPADM

## 2025-08-21 RX ORDER — ONDANSETRON 2 MG/ML
4 INJECTION INTRAMUSCULAR; INTRAVENOUS EVERY 6 HOURS PRN
Status: DISCONTINUED | OUTPATIENT
Start: 2025-08-21 | End: 2025-08-24 | Stop reason: HOSPADM

## 2025-08-21 RX ORDER — CARBOPROST TROMETHAMINE 250 UG/ML
250 INJECTION, SOLUTION INTRAMUSCULAR PRN
Status: DISCONTINUED | OUTPATIENT
Start: 2025-08-21 | End: 2025-08-24 | Stop reason: HOSPADM

## 2025-08-21 RX ADMIN — Medication 25 MCG: at 23:41

## 2025-08-21 RX ADMIN — AZITHROMYCIN 250 MG: 250 TABLET, FILM COATED ORAL at 08:46

## 2025-08-21 RX ADMIN — AMOXICILLIN 875 MG: 875 TABLET, FILM COATED ORAL at 18:45

## 2025-08-21 RX ADMIN — AMPICILLIN INJECTION 2000 MG: 2 POWDER, FOR SOLUTION INTRAMUSCULAR; INTRAVENOUS at 05:17

## 2025-08-21 RX ADMIN — AMPICILLIN INJECTION 2000 MG: 2 POWDER, FOR SOLUTION INTRAMUSCULAR; INTRAVENOUS at 11:09

## 2025-08-22 LAB
ABO + RH BLD: NORMAL
ARM BAND NUMBER: NORMAL
BASOPHILS # BLD: 0.01 K/UL (ref 0–0.2)
BASOPHILS NFR BLD: 0 % (ref 0–2)
BLOOD BANK SAMPLE EXPIRATION: NORMAL
BLOOD GROUP ANTIBODIES SERPL: NEGATIVE
EOSINOPHIL # BLD: 0.04 K/UL (ref 0.05–0.5)
EOSINOPHILS RELATIVE PERCENT: 1 % (ref 0–6)
ERYTHROCYTE [DISTWIDTH] IN BLOOD BY AUTOMATED COUNT: 19.7 % (ref 11.5–15)
HCT VFR BLD AUTO: 32.1 % (ref 34–48)
HGB BLD-MCNC: 10.4 G/DL (ref 11.5–15.5)
IMM GRANULOCYTES # BLD AUTO: <0.03 K/UL (ref 0–0.58)
IMM GRANULOCYTES NFR BLD: 0 % (ref 0–5)
LYMPHOCYTES NFR BLD: 1.42 K/UL (ref 1.5–4)
LYMPHOCYTES RELATIVE PERCENT: 26 % (ref 20–42)
MCH RBC QN AUTO: 30.3 PG (ref 26–35)
MCHC RBC AUTO-ENTMCNC: 32.4 G/DL (ref 32–34.5)
MCV RBC AUTO: 93.6 FL (ref 80–99.9)
MICROORGANISM SPEC CULT: ABNORMAL
MICROORGANISM SPEC CULT: ABNORMAL
MONOCYTES NFR BLD: 0.42 K/UL (ref 0.1–0.95)
MONOCYTES NFR BLD: 8 % (ref 2–12)
NEUTROPHILS NFR BLD: 65 % (ref 43–80)
NEUTS SEG NFR BLD: 3.55 K/UL (ref 1.8–7.3)
PLATELET # BLD AUTO: 196 K/UL (ref 130–450)
PMV BLD AUTO: 10 FL (ref 7–12)
RBC # BLD AUTO: 3.43 M/UL (ref 3.5–5.5)
SERVICE CMNT-IMP: ABNORMAL
SPECIMEN DESCRIPTION: ABNORMAL
WBC OTHER # BLD: 5.5 K/UL (ref 4.5–11.5)

## 2025-08-22 PROCEDURE — 6370000000 HC RX 637 (ALT 250 FOR IP): Performed by: STUDENT IN AN ORGANIZED HEALTH CARE EDUCATION/TRAINING PROGRAM

## 2025-08-22 PROCEDURE — 7200000001 HC VAGINAL DELIVERY

## 2025-08-22 PROCEDURE — 85025 COMPLETE CBC W/AUTO DIFF WBC: CPT

## 2025-08-22 PROCEDURE — 86900 BLOOD TYPING SEROLOGIC ABO: CPT

## 2025-08-22 PROCEDURE — 6360000002 HC RX W HCPCS: Performed by: OBSTETRICS & GYNECOLOGY

## 2025-08-22 PROCEDURE — 6370000000 HC RX 637 (ALT 250 FOR IP): Performed by: OBSTETRICS & GYNECOLOGY

## 2025-08-22 PROCEDURE — 86850 RBC ANTIBODY SCREEN: CPT

## 2025-08-22 PROCEDURE — 86901 BLOOD TYPING SEROLOGIC RH(D): CPT

## 2025-08-22 PROCEDURE — 1220000000 HC SEMI PRIVATE OB R&B

## 2025-08-22 PROCEDURE — 88307 TISSUE EXAM BY PATHOLOGIST: CPT

## 2025-08-22 PROCEDURE — 2500000003 HC RX 250 WO HCPCS: Performed by: OBSTETRICS & GYNECOLOGY

## 2025-08-22 RX ORDER — FERROUS SULFATE 325(65) MG
325 TABLET ORAL 2 TIMES DAILY WITH MEALS
Status: DISCONTINUED | OUTPATIENT
Start: 2025-08-22 | End: 2025-08-24 | Stop reason: HOSPADM

## 2025-08-22 RX ORDER — MODIFIED LANOLIN
OINTMENT (GRAM) TOPICAL PRN
Status: DISCONTINUED | OUTPATIENT
Start: 2025-08-22 | End: 2025-08-24 | Stop reason: HOSPADM

## 2025-08-22 RX ORDER — ACETAMINOPHEN 500 MG
1000 TABLET ORAL EVERY 8 HOURS SCHEDULED
Status: DISCONTINUED | OUTPATIENT
Start: 2025-08-22 | End: 2025-08-24 | Stop reason: HOSPADM

## 2025-08-22 RX ORDER — LIDOCAINE HYDROCHLORIDE 10 MG/ML
INJECTION, SOLUTION INFILTRATION; PERINEURAL
Status: DISPENSED
Start: 2025-08-22 | End: 2025-08-22

## 2025-08-22 RX ORDER — ACETAMINOPHEN 650 MG
TABLET, EXTENDED RELEASE ORAL
Status: DISPENSED
Start: 2025-08-22 | End: 2025-08-22

## 2025-08-22 RX ORDER — ONDANSETRON 4 MG/1
4 TABLET, ORALLY DISINTEGRATING ORAL EVERY 6 HOURS PRN
Status: DISCONTINUED | OUTPATIENT
Start: 2025-08-22 | End: 2025-08-24 | Stop reason: HOSPADM

## 2025-08-22 RX ORDER — SODIUM CHLORIDE 9 MG/ML
INJECTION, SOLUTION INTRAVENOUS PRN
Status: DISCONTINUED | OUTPATIENT
Start: 2025-08-22 | End: 2025-08-24 | Stop reason: HOSPADM

## 2025-08-22 RX ORDER — OXYCODONE HYDROCHLORIDE 5 MG/1
5 TABLET ORAL EVERY 6 HOURS PRN
Status: DISCONTINUED | OUTPATIENT
Start: 2025-08-22 | End: 2025-08-24 | Stop reason: HOSPADM

## 2025-08-22 RX ORDER — DOCUSATE SODIUM 100 MG/1
100 CAPSULE, LIQUID FILLED ORAL 2 TIMES DAILY
Status: DISCONTINUED | OUTPATIENT
Start: 2025-08-22 | End: 2025-08-24 | Stop reason: HOSPADM

## 2025-08-22 RX ORDER — SODIUM CHLORIDE 0.9 % (FLUSH) 0.9 %
5-40 SYRINGE (ML) INJECTION EVERY 12 HOURS SCHEDULED
Status: DISCONTINUED | OUTPATIENT
Start: 2025-08-22 | End: 2025-08-24 | Stop reason: HOSPADM

## 2025-08-22 RX ORDER — SODIUM CHLORIDE 0.9 % (FLUSH) 0.9 %
5-40 SYRINGE (ML) INJECTION PRN
Status: DISCONTINUED | OUTPATIENT
Start: 2025-08-22 | End: 2025-08-24 | Stop reason: HOSPADM

## 2025-08-22 RX ORDER — ONDANSETRON 2 MG/ML
4 INJECTION INTRAMUSCULAR; INTRAVENOUS EVERY 6 HOURS PRN
Status: DISCONTINUED | OUTPATIENT
Start: 2025-08-22 | End: 2025-08-24 | Stop reason: HOSPADM

## 2025-08-22 RX ORDER — IBUPROFEN 600 MG/1
600 TABLET, FILM COATED ORAL EVERY 6 HOURS PRN
Status: DISCONTINUED | OUTPATIENT
Start: 2025-08-22 | End: 2025-08-24 | Stop reason: HOSPADM

## 2025-08-22 RX ORDER — LOPERAMIDE HYDROCHLORIDE 2 MG/1
2 CAPSULE ORAL PRN
Status: DISCONTINUED | OUTPATIENT
Start: 2025-08-22 | End: 2025-08-24 | Stop reason: HOSPADM

## 2025-08-22 RX ORDER — SIMETHICONE 80 MG
80 TABLET,CHEWABLE ORAL EVERY 6 HOURS PRN
Status: DISCONTINUED | OUTPATIENT
Start: 2025-08-22 | End: 2025-08-24 | Stop reason: HOSPADM

## 2025-08-22 RX ORDER — SODIUM CHLORIDE, SODIUM LACTATE, POTASSIUM CHLORIDE, CALCIUM CHLORIDE 600; 310; 30; 20 MG/100ML; MG/100ML; MG/100ML; MG/100ML
INJECTION, SOLUTION INTRAVENOUS CONTINUOUS
Status: DISCONTINUED | OUTPATIENT
Start: 2025-08-22 | End: 2025-08-24 | Stop reason: HOSPADM

## 2025-08-22 RX ADMIN — IBUPROFEN 600 MG: 600 TABLET ORAL at 20:00

## 2025-08-22 RX ADMIN — AMOXICILLIN 875 MG: 875 TABLET, FILM COATED ORAL at 17:45

## 2025-08-22 RX ADMIN — Medication 1 MILLI-UNITS/MIN: at 03:56

## 2025-08-22 RX ADMIN — AMOXICILLIN 875 MG: 875 TABLET, FILM COATED ORAL at 06:58

## 2025-08-22 RX ADMIN — Medication: at 17:39

## 2025-08-22 RX ADMIN — SODIUM CHLORIDE, PRESERVATIVE FREE 10 ML: 5 INJECTION INTRAVENOUS at 21:15

## 2025-08-22 RX ADMIN — DOCUSATE SODIUM 100 MG: 100 CAPSULE, LIQUID FILLED ORAL at 17:38

## 2025-08-22 ASSESSMENT — PAIN SCALES - GENERAL
PAINLEVEL_OUTOF10: 1
PAINLEVEL_OUTOF10: 0

## 2025-08-22 ASSESSMENT — PAIN DESCRIPTION - ORIENTATION: ORIENTATION: LOWER

## 2025-08-22 ASSESSMENT — PAIN - FUNCTIONAL ASSESSMENT
PAIN_FUNCTIONAL_ASSESSMENT: 0-10
PAIN_FUNCTIONAL_ASSESSMENT: ACTIVITIES ARE NOT PREVENTED

## 2025-08-22 ASSESSMENT — PAIN DESCRIPTION - DESCRIPTORS: DESCRIPTORS: CRAMPING

## 2025-08-22 ASSESSMENT — PAIN DESCRIPTION - LOCATION: LOCATION: ABDOMEN

## 2025-08-23 LAB
ERYTHROCYTE [DISTWIDTH] IN BLOOD BY AUTOMATED COUNT: 19 % (ref 11.5–15)
HCT VFR BLD AUTO: 32.1 % (ref 34–48)
HGB BLD-MCNC: 10.4 G/DL (ref 11.5–15.5)
MCH RBC QN AUTO: 30.3 PG (ref 26–35)
MCHC RBC AUTO-ENTMCNC: 32.4 G/DL (ref 32–34.5)
MCV RBC AUTO: 93.6 FL (ref 80–99.9)
PLATELET # BLD AUTO: 204 K/UL (ref 130–450)
PMV BLD AUTO: 10.4 FL (ref 7–12)
RBC # BLD AUTO: 3.43 M/UL (ref 3.5–5.5)
WBC OTHER # BLD: 5.3 K/UL (ref 4.5–11.5)

## 2025-08-23 PROCEDURE — 6370000000 HC RX 637 (ALT 250 FOR IP): Performed by: STUDENT IN AN ORGANIZED HEALTH CARE EDUCATION/TRAINING PROGRAM

## 2025-08-23 PROCEDURE — 2500000003 HC RX 250 WO HCPCS: Performed by: OBSTETRICS & GYNECOLOGY

## 2025-08-23 PROCEDURE — 6370000000 HC RX 637 (ALT 250 FOR IP): Performed by: OBSTETRICS & GYNECOLOGY

## 2025-08-23 PROCEDURE — 85027 COMPLETE CBC AUTOMATED: CPT

## 2025-08-23 PROCEDURE — 1220000000 HC SEMI PRIVATE OB R&B

## 2025-08-23 RX ADMIN — ACETAMINOPHEN 1000 MG: 500 TABLET ORAL at 22:27

## 2025-08-23 RX ADMIN — SODIUM CHLORIDE, PRESERVATIVE FREE 10 ML: 5 INJECTION INTRAVENOUS at 09:22

## 2025-08-23 RX ADMIN — DOCUSATE SODIUM 100 MG: 100 CAPSULE, LIQUID FILLED ORAL at 22:29

## 2025-08-23 RX ADMIN — DOCUSATE SODIUM 100 MG: 100 CAPSULE, LIQUID FILLED ORAL at 09:21

## 2025-08-23 RX ADMIN — AMOXICILLIN 875 MG: 875 TABLET, FILM COATED ORAL at 22:27

## 2025-08-23 RX ADMIN — IBUPROFEN 600 MG: 600 TABLET ORAL at 23:21

## 2025-08-23 RX ADMIN — ACETAMINOPHEN 1000 MG: 500 TABLET ORAL at 17:56

## 2025-08-23 RX ADMIN — AMOXICILLIN 875 MG: 875 TABLET, FILM COATED ORAL at 09:22

## 2025-08-23 RX ADMIN — IBUPROFEN 600 MG: 600 TABLET ORAL at 17:11

## 2025-08-23 ASSESSMENT — PAIN - FUNCTIONAL ASSESSMENT
PAIN_FUNCTIONAL_ASSESSMENT: ACTIVITIES ARE NOT PREVENTED
PAIN_FUNCTIONAL_ASSESSMENT: 0-10
PAIN_FUNCTIONAL_ASSESSMENT: 0-10
PAIN_FUNCTIONAL_ASSESSMENT: ACTIVITIES ARE NOT PREVENTED
PAIN_FUNCTIONAL_ASSESSMENT: 0-10
PAIN_FUNCTIONAL_ASSESSMENT: 0-10

## 2025-08-23 ASSESSMENT — PAIN DESCRIPTION - LOCATION
LOCATION: ABDOMEN
LOCATION: ABDOMEN

## 2025-08-23 ASSESSMENT — PAIN SCALES - GENERAL
PAINLEVEL_OUTOF10: 1
PAINLEVEL_OUTOF10: 1
PAINLEVEL_OUTOF10: 2
PAINLEVEL_OUTOF10: 1

## 2025-08-23 ASSESSMENT — PAIN DESCRIPTION - ORIENTATION
ORIENTATION: LOWER
ORIENTATION: LOWER

## 2025-08-23 ASSESSMENT — PAIN DESCRIPTION - RADICULAR PAIN: RADICULAR_PAIN: ABSENT

## 2025-08-23 ASSESSMENT — PAIN DESCRIPTION - DESCRIPTORS
DESCRIPTORS: CRAMPING
DESCRIPTORS: CRAMPING

## 2025-08-24 VITALS
OXYGEN SATURATION: 99 % | TEMPERATURE: 98.6 F | RESPIRATION RATE: 16 BRPM | BODY MASS INDEX: 30.8 KG/M2 | DIASTOLIC BLOOD PRESSURE: 71 MMHG | SYSTOLIC BLOOD PRESSURE: 115 MMHG | HEIGHT: 71 IN | WEIGHT: 220 LBS | HEART RATE: 64 BPM

## 2025-08-24 PROCEDURE — 6370000000 HC RX 637 (ALT 250 FOR IP): Performed by: OBSTETRICS & GYNECOLOGY

## 2025-08-24 PROCEDURE — 6370000000 HC RX 637 (ALT 250 FOR IP): Performed by: STUDENT IN AN ORGANIZED HEALTH CARE EDUCATION/TRAINING PROGRAM

## 2025-08-24 RX ORDER — IBUPROFEN 600 MG/1
600 TABLET, FILM COATED ORAL EVERY 6 HOURS PRN
Qty: 60 TABLET | Refills: 1 | Status: SHIPPED | OUTPATIENT
Start: 2025-08-24

## 2025-08-24 RX ORDER — OXYCODONE HYDROCHLORIDE 5 MG/1
5 TABLET ORAL EVERY 6 HOURS PRN
Qty: 12 TABLET | Refills: 0 | Status: SHIPPED | OUTPATIENT
Start: 2025-08-24 | End: 2025-08-27

## 2025-08-24 RX ADMIN — IBUPROFEN 600 MG: 600 TABLET ORAL at 08:54

## 2025-08-24 RX ADMIN — DOCUSATE SODIUM 100 MG: 100 CAPSULE, LIQUID FILLED ORAL at 08:53

## 2025-08-24 RX ADMIN — AMOXICILLIN 875 MG: 875 TABLET, FILM COATED ORAL at 08:53

## 2025-08-24 RX ADMIN — ACETAMINOPHEN 1000 MG: 500 TABLET ORAL at 08:54

## 2025-08-24 ASSESSMENT — PAIN - FUNCTIONAL ASSESSMENT
PAIN_FUNCTIONAL_ASSESSMENT: 0-10
PAIN_FUNCTIONAL_ASSESSMENT: ACTIVITIES ARE NOT PREVENTED

## 2025-08-24 ASSESSMENT — PAIN SCALES - GENERAL: PAINLEVEL_OUTOF10: 1

## 2025-08-24 ASSESSMENT — PAIN DESCRIPTION - LOCATION: LOCATION: ABDOMEN

## 2025-08-24 ASSESSMENT — PAIN DESCRIPTION - DESCRIPTORS: DESCRIPTORS: CRAMPING

## 2025-08-24 ASSESSMENT — PAIN DESCRIPTION - ORIENTATION: ORIENTATION: LOWER

## 2025-08-27 LAB — SURGICAL PATHOLOGY REPORT: NORMAL

## (undated) DEVICE — PAD,NON-ADHERENT,2X3,STERILE,LF,1/PK: Brand: MEDLINE

## (undated) DEVICE — LEGGINGS, PAIR, 31X48, STERILE: Brand: MEDLINE

## (undated) DEVICE — HOSE CONN L18IN UTER DISP FOR BERK SAFETOUCH SYS

## (undated) DEVICE — ELECTRODE PT RET AD L9FT HI MOIST COND ADH HYDRGEL CORDED

## (undated) DEVICE — DRAPE,REIN 53X77,STERILE: Brand: MEDLINE

## (undated) DEVICE — JELLY,LUBE,STERILE,FLIP TOP,TUBE,2-OZ: Brand: MEDLINE

## (undated) DEVICE — CLEANER,CAUTERY TIP,2X2",STERILE: Brand: MEDLINE

## (undated) DEVICE — DOUBLE BASIN SET: Brand: MEDLINE INDUSTRIES, INC.

## (undated) DEVICE — Device

## (undated) DEVICE — COVER,LIGHT HANDLE,FLX,2/PK: Brand: MEDLINE INDUSTRIES, INC.

## (undated) DEVICE — GAUZE,SPONGE,4"X4",16PLY,XRAY,STRL,LF: Brand: MEDLINE

## (undated) DEVICE — TRAP TISS DISP FOR COLL SYS BERK SAFETOUCH

## (undated) DEVICE — MARKER,SKIN,WI/RULER AND LABELS: Brand: MEDLINE

## (undated) DEVICE — HANDLE SUCT TBNG L6FR DIA3/8IN SWVL W/ M ADPT FOR BERK PMP

## (undated) DEVICE — GOWN,SIRUS,FABRNF,XL,20/CS: Brand: MEDLINE

## (undated) DEVICE — TRAY SET D

## (undated) DEVICE — SWAB FBR TIP L16IN PLAS RAYON TIP FOR OB GYN PROCTOSCOPIC

## (undated) DEVICE — TRAY,VAG PREP,2PR VNYL GLV,4 C: Brand: MEDLINE INDUSTRIES, INC.

## (undated) DEVICE — TOWEL,OR,DSP,ST,BLUE,STD,6/PK,12PK/CS: Brand: MEDLINE

## (undated) DEVICE — 18 GA N.G. KIT, 10 PACK: Brand: SITE-RITE

## (undated) DEVICE — COVER,MAYO STAND,STERILE: Brand: MEDLINE

## (undated) DEVICE — CATHETER URETH 14FR L16IN RED RUB RND HLLW TIP W/ TWO EYE

## (undated) DEVICE — PAD,SANITARY,11 IN,MAXI,N-STRL,IND WRAP: Brand: MEDLINE

## (undated) DEVICE — SYSTEM COLL W/ TISS TRAP INCLUDE COLL CANSTR LID SET OF

## (undated) DEVICE — GLOVE ORANGE PI 7 1/2   MSG9075

## (undated) DEVICE — VAGINAL PREP TRAY: Brand: MEDLINE INDUSTRIES, INC.

## (undated) DEVICE — YANKAUER,OPEN TIP,W/O VENT,STERILE: Brand: MEDLINE INDUSTRIES, INC.

## (undated) DEVICE — NEPTUNE E-SEP SMOKE EVACUATION PENCIL, COATED, 70MM BLADE, PUSH BUTTON SWITCH: Brand: NEPTUNE E-SEP

## (undated) DEVICE — GOWN,SIRUS,FABRNF,L,20/CS: Brand: MEDLINE

## (undated) DEVICE — 4-PORT MANIFOLD: Brand: NEPTUNE 2

## (undated) DEVICE — TUBING, SUCTION, 3/16" X 12', STRAIGHT: Brand: MEDLINE

## (undated) DEVICE — BASIC SINGLE BASIN 1-LF: Brand: MEDLINE INDUSTRIES, INC.